# Patient Record
Sex: MALE | Race: WHITE
[De-identification: names, ages, dates, MRNs, and addresses within clinical notes are randomized per-mention and may not be internally consistent; named-entity substitution may affect disease eponyms.]

---

## 2017-06-14 ENCOUNTER — HOSPITAL ENCOUNTER (INPATIENT)
Dept: HOSPITAL 12 - ER | Age: 77
LOS: 6 days | Discharge: HOME HEALTH | DRG: 871 | End: 2017-06-20
Payer: MEDICARE

## 2017-06-14 VITALS — BODY MASS INDEX: 27.09 KG/M2 | WEIGHT: 200 LBS | HEIGHT: 72 IN

## 2017-06-14 VITALS — DIASTOLIC BLOOD PRESSURE: 73 MMHG | SYSTOLIC BLOOD PRESSURE: 122 MMHG

## 2017-06-14 VITALS — SYSTOLIC BLOOD PRESSURE: 130 MMHG | DIASTOLIC BLOOD PRESSURE: 78 MMHG

## 2017-06-14 DIAGNOSIS — G20: ICD-10-CM

## 2017-06-14 DIAGNOSIS — Z79.84: ICD-10-CM

## 2017-06-14 DIAGNOSIS — N40.0: ICD-10-CM

## 2017-06-14 DIAGNOSIS — D68.59: ICD-10-CM

## 2017-06-14 DIAGNOSIS — Z74.01: ICD-10-CM

## 2017-06-14 DIAGNOSIS — Z74.09: ICD-10-CM

## 2017-06-14 DIAGNOSIS — Z95.1: ICD-10-CM

## 2017-06-14 DIAGNOSIS — K76.0: ICD-10-CM

## 2017-06-14 DIAGNOSIS — N17.0: ICD-10-CM

## 2017-06-14 DIAGNOSIS — E87.2: ICD-10-CM

## 2017-06-14 DIAGNOSIS — Z79.82: ICD-10-CM

## 2017-06-14 DIAGNOSIS — I10: ICD-10-CM

## 2017-06-14 DIAGNOSIS — D64.9: ICD-10-CM

## 2017-06-14 DIAGNOSIS — Z87.440: ICD-10-CM

## 2017-06-14 DIAGNOSIS — Z79.899: ICD-10-CM

## 2017-06-14 DIAGNOSIS — E78.00: ICD-10-CM

## 2017-06-14 DIAGNOSIS — R65.21: ICD-10-CM

## 2017-06-14 DIAGNOSIS — G93.41: ICD-10-CM

## 2017-06-14 DIAGNOSIS — Z98.890: ICD-10-CM

## 2017-06-14 DIAGNOSIS — E11.9: ICD-10-CM

## 2017-06-14 DIAGNOSIS — B95.62: ICD-10-CM

## 2017-06-14 DIAGNOSIS — N39.0: ICD-10-CM

## 2017-06-14 DIAGNOSIS — R13.10: ICD-10-CM

## 2017-06-14 DIAGNOSIS — E66.9: ICD-10-CM

## 2017-06-14 DIAGNOSIS — N20.0: ICD-10-CM

## 2017-06-14 DIAGNOSIS — I25.10: ICD-10-CM

## 2017-06-14 DIAGNOSIS — Z88.8: ICD-10-CM

## 2017-06-14 DIAGNOSIS — K44.9: ICD-10-CM

## 2017-06-14 DIAGNOSIS — K21.9: ICD-10-CM

## 2017-06-14 DIAGNOSIS — A41.9: Primary | ICD-10-CM

## 2017-06-14 DIAGNOSIS — Z90.79: ICD-10-CM

## 2017-06-14 DIAGNOSIS — G47.33: ICD-10-CM

## 2017-06-14 LAB
ALP SERPL-CCNC: 46 U/L (ref 50–136)
ALT SERPL W/O P-5'-P-CCNC: 13 U/L (ref 16–63)
APPEARANCE UR: (no result)
AST SERPL-CCNC: 17 U/L (ref 15–37)
BASOPHILS # BLD AUTO: 0 K/UL (ref 0–8)
BASOPHILS NFR BLD AUTO: 0.3 % (ref 0–2)
BILIRUB DIRECT SERPL-MCNC: 0.1 MG/DL (ref 0–0.2)
BILIRUB SERPL-MCNC: 0.3 MG/DL (ref 0.2–1)
BILIRUB UR QL STRIP: NEGATIVE
BUN SERPL-MCNC: 31 MG/DL (ref 7–18)
CHLORIDE SERPL-SCNC: 104 MMOL/L (ref 98–107)
CO2 SERPL-SCNC: 27 MMOL/L (ref 21–32)
COLOR UR: YELLOW
CREAT SERPL-MCNC: 1.4 MG/DL (ref 0.6–1.3)
DEPRECATED SQUAMOUS URNS QL MICRO: (no result) /HPF
EOSINOPHIL # BLD AUTO: 0.1 K/UL (ref 0–0.7)
EOSINOPHIL NFR BLD AUTO: 0.9 % (ref 0–7)
GLUCOSE SERPL-MCNC: 128 MG/DL (ref 74–106)
GLUCOSE UR STRIP-MCNC: NEGATIVE MG/DL
HCT VFR BLD AUTO: 36.1 % (ref 40–50)
HGB BLD-MCNC: 12.1 G/DL (ref 14–18)
HGB UR QL STRIP: NEGATIVE
IRON SERPL-MCNC: 56 UG/DL (ref 50–175)
KETONES UR STRIP-MCNC: (no result) MG/DL
LEUKOCYTE ESTERASE UR QL STRIP: (no result)
LYMPHOCYTES # BLD AUTO: 0.9 K/UL (ref 0.8–4.8)
LYMPHOCYTES NFR BLD AUTO: 12.7 % (ref 20.5–51.5)
MCH RBC QN AUTO: 30.2 UUG (ref 27–31)
MCHC RBC AUTO-ENTMCNC: 34 G/DL (ref 32–37)
MCV RBC AUTO: 89.9 FL (ref 82–92)
MONOCYTES # BLD AUTO: 0.5 K/UL (ref 0.1–1.3)
MONOCYTES NFR BLD AUTO: 6.7 % (ref 0–11)
NEUTROPHILS # BLD AUTO: 5.4 K/UL (ref 1.8–8.9)
NEUTROPHILS NFR BLD AUTO: 79.4 % (ref 38.5–71.5)
NITRITE UR QL STRIP: POSITIVE
PH UR STRIP: 7.5 [PH] (ref 5–8)
PLATELET # BLD AUTO: 269 K/UL (ref 150–450)
POTASSIUM SERPL-SCNC: 4.5 MMOL/L (ref 3.5–5.1)
PROT UR QL STRIP: (no result)
RBC # BLD AUTO: 4.02 MIL/UL (ref 4.7–6.1)
RBC #/AREA URNS HPF: (no result) /HPF (ref 0–3)
SP GR UR STRIP: 1.01 (ref 1–1.03)
UROBILINOGEN UR STRIP-MCNC: 0.2 E.U./DL
WBC # BLD AUTO: 6.9 K/UL (ref 4–11.2)
WBC #/AREA URNS HPF: (no result) /HPF
WS STN SPEC: 8.1 G/DL (ref 6.4–8.2)

## 2017-06-14 PROCEDURE — C1758 CATHETER, URETERAL: HCPCS

## 2017-06-14 PROCEDURE — C9113 INJ PANTOPRAZOLE SODIUM, VIA: HCPCS

## 2017-06-14 PROCEDURE — A4663 DIALYSIS BLOOD PRESSURE CUFF: HCPCS

## 2017-06-14 RX ADMIN — METOPROLOL SUCCINATE SCH MG: 50 TABLET, FILM COATED, EXTENDED RELEASE ORAL at 21:00

## 2017-06-14 RX ADMIN — DEXTROSE AND SODIUM CHLORIDE PRN MLS/HR: 5; .9 INJECTION, SOLUTION INTRAVENOUS at 17:51

## 2017-06-14 RX ADMIN — CARBIDOPA AND LEVODOPA SCH TAB.SA: 25; 100 TABLET, EXTENDED RELEASE ORAL at 21:00

## 2017-06-14 NOTE — NUR
nsg: pt received awake but aphasic. does not follow commands.  on RA saturating at 95%. 
tele, SR. private caregiver at the bedside.  npo but on cont ivf with d5ns at 100ml/hr. HOB 
elevated 35 degrees. cont to monitor.

## 2017-06-14 NOTE — NUR
PT IS LAYING IN BED COMFORTABLY. NO S/S OF RESPIRATORY DISTRESS NOTED. NO P[AIN NOTED. ALL 
SAFETY NEEDS ARE MET. SITTER BY THE BEDSIDE.

## 2017-06-15 VITALS — DIASTOLIC BLOOD PRESSURE: 72 MMHG | SYSTOLIC BLOOD PRESSURE: 111 MMHG

## 2017-06-15 VITALS — DIASTOLIC BLOOD PRESSURE: 66 MMHG | SYSTOLIC BLOOD PRESSURE: 106 MMHG

## 2017-06-15 VITALS — SYSTOLIC BLOOD PRESSURE: 139 MMHG | DIASTOLIC BLOOD PRESSURE: 82 MMHG

## 2017-06-15 VITALS — DIASTOLIC BLOOD PRESSURE: 72 MMHG | SYSTOLIC BLOOD PRESSURE: 139 MMHG

## 2017-06-15 VITALS — SYSTOLIC BLOOD PRESSURE: 128 MMHG | DIASTOLIC BLOOD PRESSURE: 81 MMHG

## 2017-06-15 LAB
ALP SERPL-CCNC: 39 U/L (ref 50–136)
ALT SERPL W/O P-5'-P-CCNC: 15 U/L (ref 16–63)
AST SERPL-CCNC: 14 U/L (ref 15–37)
BASOPHILS # BLD AUTO: 0 K/UL (ref 0–8)
BASOPHILS NFR BLD AUTO: 0.3 % (ref 0–2)
BILIRUB SERPL-MCNC: 0.3 MG/DL (ref 0.2–1)
BUN SERPL-MCNC: 18 MG/DL (ref 7–18)
CHLORIDE SERPL-SCNC: 105 MMOL/L (ref 98–107)
CO2 SERPL-SCNC: 25 MMOL/L (ref 21–32)
CREAT SERPL-MCNC: 1 MG/DL (ref 0.6–1.3)
EOSINOPHIL # BLD AUTO: 0.1 K/UL (ref 0–0.7)
EOSINOPHIL NFR BLD AUTO: 1.8 % (ref 0–7)
GLUCOSE SERPL-MCNC: 144 MG/DL (ref 74–106)
HCT VFR BLD AUTO: 30.2 % (ref 40–50)
HGB BLD-MCNC: 10.4 G/DL (ref 14–18)
LYMPHOCYTES # BLD AUTO: 0.8 K/UL (ref 0.8–4.8)
LYMPHOCYTES NFR BLD AUTO: 20.6 % (ref 20.5–51.5)
MCH RBC QN AUTO: 30.7 UUG (ref 27–31)
MCHC RBC AUTO-ENTMCNC: 35 G/DL (ref 32–37)
MCV RBC AUTO: 88.8 FL (ref 82–92)
MONOCYTES # BLD AUTO: 0.4 K/UL (ref 0.1–1.3)
MONOCYTES NFR BLD AUTO: 9.2 % (ref 0–11)
NEUTROPHILS # BLD AUTO: 2.6 K/UL (ref 1.8–8.9)
NEUTROPHILS NFR BLD AUTO: 68.1 % (ref 38.5–71.5)
PLATELET # BLD AUTO: 200 K/UL (ref 150–450)
POTASSIUM SERPL-SCNC: 3.7 MMOL/L (ref 3.5–5.1)
RBC # BLD AUTO: 3.4 MIL/UL (ref 4.7–6.1)
WBC # BLD AUTO: 3.9 K/UL (ref 4–11.2)
WS STN SPEC: 6.8 G/DL (ref 6.4–8.2)

## 2017-06-15 RX ADMIN — METOPROLOL SUCCINATE SCH MG: 50 TABLET, FILM COATED, EXTENDED RELEASE ORAL at 20:55

## 2017-06-15 RX ADMIN — SODIUM CHLORIDE SCH UNIT: 9 INJECTION, SOLUTION INTRAVENOUS at 05:59

## 2017-06-15 RX ADMIN — Medication SCH EACH: at 00:04

## 2017-06-15 RX ADMIN — LEVOFLOXACIN SCH MLS/HR: 5 INJECTION, SOLUTION INTRAVENOUS at 13:42

## 2017-06-15 RX ADMIN — ASPIRIN SCH MG: 81 TABLET, COATED ORAL at 09:33

## 2017-06-15 RX ADMIN — BETHANECHOL CHLORIDE SCH MG: 25 TABLET ORAL at 16:11

## 2017-06-15 RX ADMIN — SODIUM CHLORIDE SCH UNIT: 9 INJECTION, SOLUTION INTRAVENOUS at 11:52

## 2017-06-15 RX ADMIN — PIOGLITAZONE SCH MG: 30 TABLET ORAL at 11:36

## 2017-06-15 RX ADMIN — DEXTROSE AND SODIUM CHLORIDE PRN MLS/HR: 5; .9 INJECTION, SOLUTION INTRAVENOUS at 04:05

## 2017-06-15 RX ADMIN — Medication SCH EACH: at 16:25

## 2017-06-15 RX ADMIN — BETHANECHOL CHLORIDE SCH MG: 25 TABLET ORAL at 09:34

## 2017-06-15 RX ADMIN — Medication SCH EACH: at 11:54

## 2017-06-15 RX ADMIN — CARBIDOPA AND LEVODOPA SCH TAB: 25; 250 TABLET ORAL at 18:40

## 2017-06-15 RX ADMIN — PANTOPRAZOLE SODIUM SCH MG: 40 TABLET, DELAYED RELEASE ORAL at 05:59

## 2017-06-15 RX ADMIN — Medication SCH EACH: at 20:56

## 2017-06-15 RX ADMIN — CARBIDOPA AND LEVODOPA SCH TAB: 25; 250 TABLET ORAL at 06:00

## 2017-06-15 RX ADMIN — BETHANECHOL CHLORIDE SCH MG: 25 TABLET ORAL at 13:31

## 2017-06-15 RX ADMIN — CARBIDOPA AND LEVODOPA SCH TAB: 25; 250 TABLET ORAL at 16:09

## 2017-06-15 RX ADMIN — SODIUM CHLORIDE SCH UNIT: 9 INJECTION, SOLUTION INTRAVENOUS at 00:05

## 2017-06-15 RX ADMIN — ENALAPRIL MALEATE SCH MG: 10 TABLET ORAL at 15:00

## 2017-06-15 RX ADMIN — Medication SCH EACH: at 05:57

## 2017-06-15 RX ADMIN — CARBIDOPA AND LEVODOPA SCH TAB: 25; 250 TABLET ORAL at 11:37

## 2017-06-15 RX ADMIN — CARBIDOPA AND LEVODOPA SCH TAB.SA: 25; 100 TABLET, EXTENDED RELEASE ORAL at 20:55

## 2017-06-15 NOTE — NUR
ST eval done at bedside, with recommendation for Mechanical soft, Thickened nectar ordered. 
Breakfast served with aspiration precaution. Caregiver instructed.

## 2017-06-16 VITALS — DIASTOLIC BLOOD PRESSURE: 63 MMHG | SYSTOLIC BLOOD PRESSURE: 93 MMHG

## 2017-06-16 VITALS — SYSTOLIC BLOOD PRESSURE: 117 MMHG | DIASTOLIC BLOOD PRESSURE: 72 MMHG

## 2017-06-16 VITALS — SYSTOLIC BLOOD PRESSURE: 115 MMHG | DIASTOLIC BLOOD PRESSURE: 77 MMHG

## 2017-06-16 VITALS — DIASTOLIC BLOOD PRESSURE: 74 MMHG | SYSTOLIC BLOOD PRESSURE: 134 MMHG

## 2017-06-16 RX ADMIN — BETHANECHOL CHLORIDE SCH MG: 25 TABLET ORAL at 08:48

## 2017-06-16 RX ADMIN — SODIUM CHLORIDE PRN UNIT: 9 INJECTION, SOLUTION INTRAVENOUS at 11:58

## 2017-06-16 RX ADMIN — METOPROLOL SUCCINATE SCH MG: 50 TABLET, FILM COATED, EXTENDED RELEASE ORAL at 20:42

## 2017-06-16 RX ADMIN — ENALAPRIL MALEATE SCH MG: 10 TABLET ORAL at 15:00

## 2017-06-16 RX ADMIN — CARBIDOPA AND LEVODOPA SCH TAB: 25; 250 TABLET ORAL at 15:20

## 2017-06-16 RX ADMIN — Medication SCH EACH: at 16:12

## 2017-06-16 RX ADMIN — CARBIDOPA AND LEVODOPA SCH TAB: 25; 250 TABLET ORAL at 11:59

## 2017-06-16 RX ADMIN — CARBIDOPA AND LEVODOPA SCH TAB: 25; 250 TABLET ORAL at 06:11

## 2017-06-16 RX ADMIN — ANORECTAL OINTMENT SCH GM: 15.7; .44; 24; 20.6 OINTMENT TOPICAL at 20:42

## 2017-06-16 RX ADMIN — PIOGLITAZONE SCH MG: 30 TABLET ORAL at 11:58

## 2017-06-16 RX ADMIN — Medication SCH EACH: at 20:42

## 2017-06-16 RX ADMIN — BETHANECHOL CHLORIDE SCH MG: 25 TABLET ORAL at 16:22

## 2017-06-16 RX ADMIN — DEXTROSE SCH MLS/HR: 50 INJECTION, SOLUTION INTRAVENOUS at 20:11

## 2017-06-16 RX ADMIN — ACETAMINOPHEN PRN MG: 325 TABLET ORAL at 20:42

## 2017-06-16 RX ADMIN — CARBIDOPA AND LEVODOPA SCH TAB.SA: 25; 100 TABLET, EXTENDED RELEASE ORAL at 20:41

## 2017-06-16 RX ADMIN — Medication SCH EACH: at 06:33

## 2017-06-16 RX ADMIN — PANTOPRAZOLE SODIUM SCH MG: 40 TABLET, DELAYED RELEASE ORAL at 06:12

## 2017-06-16 RX ADMIN — ASPIRIN SCH MG: 81 TABLET, COATED ORAL at 08:48

## 2017-06-16 RX ADMIN — ANORECTAL OINTMENT SCH GM: 15.7; .44; 24; 20.6 OINTMENT TOPICAL at 15:20

## 2017-06-16 RX ADMIN — LEVOFLOXACIN SCH MLS/HR: 5 INJECTION, SOLUTION INTRAVENOUS at 12:06

## 2017-06-16 RX ADMIN — CARBIDOPA AND LEVODOPA SCH TAB: 25; 250 TABLET ORAL at 18:24

## 2017-06-16 RX ADMIN — Medication SCH EACH: at 11:53

## 2017-06-16 RX ADMIN — BETHANECHOL CHLORIDE SCH MG: 25 TABLET ORAL at 12:06

## 2017-06-16 NOTE — NUR
PT IS SITTING UP IN BED. NO S/S OF RESPIRATORY DISTRESS NOTED. NO PAIN NOTED. CARETAKER BY 
THE BEDSIDE. IV INTACT/PATENT.

## 2017-06-16 NOTE — NUR
received the nursing report on the pt, z-guard will administer per 's order. Pt is laying 
in bed comfortably. All safety needs are met. Caretaker by the bedside.

## 2017-06-16 NOTE — NUR
WOUND CARE CONSULT: PT PRESENTS WITH STAGE II ULCERS TO BILATERAL BUTTOCKS, PRESENT ON 
ADMISSION. RECOMMENDATIONS MADE FOR WOUND CARE AND SKIN PROTECTION. DISCUSSED WITH NURSING 
STAFF. PT IS INCONTINENT AND SOMEWHAT IMMOBILE. BONITA SCORE IS 10. PT ON FIRST STEP 
MATTRESS. WILL SEE PRN. MD IN AGREEMENT WITH PLAN OF CARE. 

-------------------------------------------------------------------------------

Addendum: 06/16/17 at 1238 by MARIA SCOTT RN

-------------------------------------------------------------------------------

Amended: Links added.

## 2017-06-16 NOTE — NUR
RECEIVED PATIENT AWAKE IN BED WITH PRIVATE CAREGIVER AT BEDSIDE. PATIENT IS ALERT TO SELF. 
APPEARS APHASIC BUT PATIENT DOES SPEAK AT TIMES. NO S/S OF PAIN OR DISCOMFORT. NO RESP. 
DISTRESS NOTED. VSS. H/L INTACT AND PATENT. PATIENT TRANSFERRED ROOM CLOSER TO NURSES 
STATION FOR CLOSER OBSERVATION. ON AIR MATTRESS. DRESSING NOTED TO BUTTOCKS/SACRAL AREA, 
C/D/I. ON RA. CALL LIGHT IN REACH. ALL NEEDS ATTENDED. WILL CONTINUE TO MONITOR.

## 2017-06-16 NOTE — NUR
Clinical pharmacy note-Vancomycin dosing per pharmacy



Subjective:

To start Vancomycin dosing on this 76 year old patient for UTI/early sepsis



Objective:

BUN 18  Scr 1.0 (6/15)  WBC 3.9(6/15)  Temp 98.6

Ht 6'   Wt 200 lbs

Urine culture: Staph Aureus >100k



Assessment\Plan:

Will start Vancomycin 1500mg IV every 16 hrs (first dose tonight at 2000) and draw trough by 
4th dose (not ordered yet) for expected trough around 15( UTI but early sepsis, WBC 3.9, 
diabetic). 

Will monitor renal function closely to adjust the dose if needed. Will follow daily.

## 2017-06-16 NOTE — NUR
PT IN BED RESTING AT THIS TIME, SLEPT INTERMITTENTLY DURING SHIFT. IN NO ACUTE SIGNS OF 
DISTRESS. PT IS NONVERBAL. TURNED AND REPOSITIONED. WOUND CARE TO L AND R BUTTOCKS.  BLOOD 
SUGAR CHECKED, LAST NIGHT WAS 67, GIVEN SNACKS, RECHECKED  AND INCREASED TO 84. THIS AM BS 
. CONTINUED TO MONITOR.

## 2017-06-17 VITALS — SYSTOLIC BLOOD PRESSURE: 104 MMHG | DIASTOLIC BLOOD PRESSURE: 63 MMHG

## 2017-06-17 VITALS — SYSTOLIC BLOOD PRESSURE: 115 MMHG | DIASTOLIC BLOOD PRESSURE: 72 MMHG

## 2017-06-17 VITALS — SYSTOLIC BLOOD PRESSURE: 126 MMHG | DIASTOLIC BLOOD PRESSURE: 75 MMHG

## 2017-06-17 VITALS — SYSTOLIC BLOOD PRESSURE: 112 MMHG | DIASTOLIC BLOOD PRESSURE: 63 MMHG

## 2017-06-17 LAB
BASOPHILS # BLD AUTO: 0 K/UL (ref 0–8)
BASOPHILS NFR BLD AUTO: 0.3 % (ref 0–2)
BUN SERPL-MCNC: 5 MG/DL (ref 7–18)
CHLORIDE SERPL-SCNC: 106 MMOL/L (ref 98–107)
CO2 SERPL-SCNC: 24 MMOL/L (ref 21–32)
CREAT SERPL-MCNC: 1 MG/DL (ref 0.6–1.3)
EOSINOPHIL # BLD AUTO: 0.1 K/UL (ref 0–0.7)
EOSINOPHIL NFR BLD AUTO: 2 % (ref 0–7)
GLUCOSE SERPL-MCNC: 115 MG/DL (ref 74–106)
HCT VFR BLD AUTO: 33.1 % (ref 40–50)
HGB BLD-MCNC: 11.4 G/DL (ref 14–18)
LYMPHOCYTES # BLD AUTO: 0.8 K/UL (ref 0.8–4.8)
LYMPHOCYTES NFR BLD AUTO: 19.6 % (ref 20.5–51.5)
MAGNESIUM SERPL-MCNC: 1.5 MG/DL (ref 1.8–2.4)
MCH RBC QN AUTO: 30.6 UUG (ref 27–31)
MCHC RBC AUTO-ENTMCNC: 34 G/DL (ref 32–37)
MCV RBC AUTO: 89.1 FL (ref 82–92)
MONOCYTES # BLD AUTO: 0.4 K/UL (ref 0.1–1.3)
MONOCYTES NFR BLD AUTO: 8.5 % (ref 0–11)
NEUTROPHILS # BLD AUTO: 2.8 K/UL (ref 1.8–8.9)
NEUTROPHILS NFR BLD AUTO: 69.6 % (ref 38.5–71.5)
PHOSPHATE SERPL-MCNC: 4.2 MG/DL (ref 2.5–4.9)
PLATELET # BLD AUTO: 213 K/UL (ref 150–450)
POTASSIUM SERPL-SCNC: 3.6 MMOL/L (ref 3.5–5.1)
RBC # BLD AUTO: 3.71 MIL/UL (ref 4.7–6.1)
WBC # BLD AUTO: 4.1 K/UL (ref 4–11.2)

## 2017-06-17 RX ADMIN — Medication SCH EACH: at 20:24

## 2017-06-17 RX ADMIN — CARBIDOPA AND LEVODOPA SCH TAB: 25; 250 TABLET ORAL at 19:04

## 2017-06-17 RX ADMIN — Medication SCH EACH: at 17:25

## 2017-06-17 RX ADMIN — DEXTROSE SCH MLS/HR: 50 INJECTION, SOLUTION INTRAVENOUS at 12:08

## 2017-06-17 RX ADMIN — MAGNESIUM SULFATE IN DEXTROSE SCH MLS/HR: 10 INJECTION, SOLUTION INTRAVENOUS at 14:29

## 2017-06-17 RX ADMIN — MAGNESIUM SULFATE IN DEXTROSE SCH MLS/HR: 10 INJECTION, SOLUTION INTRAVENOUS at 16:14

## 2017-06-17 RX ADMIN — SODIUM CHLORIDE PRN UNIT: 9 INJECTION, SOLUTION INTRAVENOUS at 17:28

## 2017-06-17 RX ADMIN — BETHANECHOL CHLORIDE SCH MG: 25 TABLET ORAL at 12:08

## 2017-06-17 RX ADMIN — Medication SCH EACH: at 07:10

## 2017-06-17 RX ADMIN — Medication SCH EACH: at 11:10

## 2017-06-17 RX ADMIN — CARBIDOPA AND LEVODOPA SCH TAB: 25; 250 TABLET ORAL at 11:09

## 2017-06-17 RX ADMIN — ASPIRIN SCH MG: 81 TABLET, COATED ORAL at 09:17

## 2017-06-17 RX ADMIN — MAGNESIUM SULFATE IN DEXTROSE SCH MLS/HR: 10 INJECTION, SOLUTION INTRAVENOUS at 17:26

## 2017-06-17 RX ADMIN — ANORECTAL OINTMENT SCH APPLIC: 15.7; .44; 24; 20.6 OINTMENT TOPICAL at 09:18

## 2017-06-17 RX ADMIN — CARBIDOPA AND LEVODOPA SCH TAB: 25; 250 TABLET ORAL at 16:07

## 2017-06-17 RX ADMIN — CARBIDOPA AND LEVODOPA SCH TAB: 25; 250 TABLET ORAL at 07:09

## 2017-06-17 RX ADMIN — SODIUM CHLORIDE PRN UNIT: 9 INJECTION, SOLUTION INTRAVENOUS at 20:26

## 2017-06-17 RX ADMIN — ENALAPRIL MALEATE SCH MG: 10 TABLET ORAL at 16:09

## 2017-06-17 RX ADMIN — BETHANECHOL CHLORIDE SCH MG: 25 TABLET ORAL at 17:26

## 2017-06-17 RX ADMIN — METOPROLOL SUCCINATE SCH MG: 50 TABLET, FILM COATED, EXTENDED RELEASE ORAL at 20:11

## 2017-06-17 RX ADMIN — BETHANECHOL CHLORIDE SCH MG: 25 TABLET ORAL at 09:17

## 2017-06-17 RX ADMIN — SULFAMETHOXAZOLE AND TRIMETHOPRIM SCH TAB: 800; 160 TABLET ORAL at 20:07

## 2017-06-17 RX ADMIN — CARBIDOPA AND LEVODOPA SCH TAB.SA: 25; 100 TABLET, EXTENDED RELEASE ORAL at 20:08

## 2017-06-17 RX ADMIN — ANORECTAL OINTMENT SCH APPLIC: 15.7; .44; 24; 20.6 OINTMENT TOPICAL at 20:08

## 2017-06-17 RX ADMIN — PIOGLITAZONE SCH MG: 30 TABLET ORAL at 11:09

## 2017-06-17 RX ADMIN — PANTOPRAZOLE SODIUM SCH MG: 40 TABLET, DELAYED RELEASE ORAL at 07:10

## 2017-06-17 RX ADMIN — SODIUM CHLORIDE PRN UNIT: 9 INJECTION, SOLUTION INTRAVENOUS at 12:11

## 2017-06-17 NOTE — NUR
PATIENT ASLEEP IN BED. REPOSITIONED TO SIDE. HEPLOCK NOTED TO RIGHT WRIST, DISLODGED. 
REINSERTED TO LEFT HAND #22 GAUGE. HEAVILY SLEEPING AND APPEARS SLIGHTLY DIAPHORETIC.  
CHECKED BLOOD SUGAR AND RECEIVED 118. VSS. WILL CONTINUE TO MONITOR AND ASSESS.

## 2017-06-17 NOTE — NUR
RECEIVED PATIENT AWAKE IN BED. A/O X2. DR. MCKEON AT BEDSIDE TO SEE PATIENT. PATIENT IS 
ABLE TO FOLLOW DIRECTIONS AND VERBALIZE NEEDS. SPEECH IS DELAYED BUT PATIENT IS ALERT AND 
ABLE TO SPEAK. DENIES PAIN. NO 

S/S OF PAIN OR DISCOMFORT. NO RESP. DISTRESS NOTED. HEPLOCK INTACT AND PATENT, NOTED TO LEFT 
HAND #22 GAUGE. MEPILEX NOTED TO BILATERAL BUTTOCKS, C/D/I. BED ALARM ON. CALL LIGHT IN 
REACH, ALL NEEDS ATTENDED. WILL CONTINUE TO MONITOR.

## 2017-06-17 NOTE — NUR
Clinical pharmacy note-Vancomycin dosing per pharmacy



Subjective:

To continue Vancomycin dosing on this 76 year old patient for UTI/early sepsis



Objective:

BUN 5  Scr 1.0  WBC 4.1  Temp 98.4

Ht 6'   Wt 200 lbs

Urine culture: Staph Aureus >100k



Assessment\Plan:

As renal function stable, will continue Vancomycin 1500mg IV every 16 hrs (second dose was 
today at 1200) and draw trough by 4th dose (not ordered yet) for expected trough around 15( 
UTI but early sepsis, WBC 3.9, diabetic). 

Will monitor renal function closely to adjust the dose if needed. Will follow daily.

## 2017-06-17 NOTE — NUR
REPORT GIVEN TO RN. PATIENT ASLEEP IN BED. NO RESP. DISTRESS NOTED. ALL NEEDS ATTENDED. WILL 
CONTINUE TO MONITOR.

## 2017-06-17 NOTE — NUR
URINE RESULT WAS MRSA POSITIVE REPORT TO JEREMIAH HANLEY AND WILL NOTIFY ID MD DR DREYER/OR NP FOR HIM  TODAY PATIENT ON ANTIBIOTICS VANCOMYCIN AND ZOSYN

## 2017-06-18 VITALS — DIASTOLIC BLOOD PRESSURE: 76 MMHG | SYSTOLIC BLOOD PRESSURE: 122 MMHG

## 2017-06-18 VITALS — DIASTOLIC BLOOD PRESSURE: 68 MMHG | SYSTOLIC BLOOD PRESSURE: 103 MMHG

## 2017-06-18 VITALS — DIASTOLIC BLOOD PRESSURE: 62 MMHG | SYSTOLIC BLOOD PRESSURE: 108 MMHG

## 2017-06-18 VITALS — SYSTOLIC BLOOD PRESSURE: 114 MMHG | DIASTOLIC BLOOD PRESSURE: 67 MMHG

## 2017-06-18 LAB
BASOPHILS # BLD AUTO: 0 K/UL (ref 0–8)
BASOPHILS NFR BLD AUTO: 0 % (ref 0–2)
BUN SERPL-MCNC: 7 MG/DL (ref 7–18)
CHLORIDE SERPL-SCNC: 102 MMOL/L (ref 98–107)
CO2 SERPL-SCNC: 25 MMOL/L (ref 21–32)
CREAT SERPL-MCNC: 1.1 MG/DL (ref 0.6–1.3)
EOSINOPHIL # BLD AUTO: 0.1 K/UL (ref 0–0.7)
EOSINOPHIL NFR BLD AUTO: 1.7 % (ref 0–7)
GLUCOSE SERPL-MCNC: 154 MG/DL (ref 74–106)
HCT VFR BLD AUTO: 38.4 % (ref 40–50)
HGB BLD-MCNC: 12.9 G/DL (ref 14–18)
LYMPHOCYTES # BLD AUTO: 1 K/UL (ref 0.8–4.8)
LYMPHOCYTES NFR BLD AUTO: 16.5 % (ref 20.5–51.5)
MAGNESIUM SERPL-MCNC: 1.9 MG/DL (ref 1.8–2.4)
MCH RBC QN AUTO: 29.9 UUG (ref 27–31)
MCHC RBC AUTO-ENTMCNC: 34 G/DL (ref 32–37)
MCV RBC AUTO: 89.2 FL (ref 82–92)
MONOCYTES # BLD AUTO: 0.4 K/UL (ref 0.1–1.3)
MONOCYTES NFR BLD AUTO: 7.7 % (ref 0–11)
NEUTROPHILS # BLD AUTO: 4.3 K/UL (ref 1.8–8.9)
NEUTROPHILS NFR BLD AUTO: 74.1 % (ref 38.5–71.5)
PHOSPHATE SERPL-MCNC: 4 MG/DL (ref 2.5–4.9)
PLATELET # BLD AUTO: 246 K/UL (ref 150–450)
POTASSIUM SERPL-SCNC: 3.6 MMOL/L (ref 3.5–5.1)
RBC # BLD AUTO: 4.31 MIL/UL (ref 4.7–6.1)
WBC # BLD AUTO: 5.8 K/UL (ref 4–11.2)

## 2017-06-18 RX ADMIN — Medication SCH EACH: at 06:45

## 2017-06-18 RX ADMIN — PANTOPRAZOLE SODIUM SCH MG: 40 TABLET, DELAYED RELEASE ORAL at 07:00

## 2017-06-18 RX ADMIN — CARBIDOPA AND LEVODOPA SCH TAB: 25; 250 TABLET ORAL at 15:29

## 2017-06-18 RX ADMIN — CARBIDOPA AND LEVODOPA SCH TAB: 25; 250 TABLET ORAL at 18:25

## 2017-06-18 RX ADMIN — SODIUM CHLORIDE PRN UNIT: 9 INJECTION, SOLUTION INTRAVENOUS at 21:16

## 2017-06-18 RX ADMIN — Medication SCH EACH: at 20:32

## 2017-06-18 RX ADMIN — SULFAMETHOXAZOLE AND TRIMETHOPRIM SCH TAB: 800; 160 TABLET ORAL at 08:37

## 2017-06-18 RX ADMIN — ANORECTAL OINTMENT SCH APPLIC: 15.7; .44; 24; 20.6 OINTMENT TOPICAL at 20:31

## 2017-06-18 RX ADMIN — CARBIDOPA AND LEVODOPA SCH TAB: 25; 250 TABLET ORAL at 07:00

## 2017-06-18 RX ADMIN — BETHANECHOL CHLORIDE SCH MG: 25 TABLET ORAL at 08:37

## 2017-06-18 RX ADMIN — PIOGLITAZONE SCH MG: 30 TABLET ORAL at 11:36

## 2017-06-18 RX ADMIN — Medication SCH EACH: at 15:49

## 2017-06-18 RX ADMIN — ENALAPRIL MALEATE SCH MG: 10 TABLET ORAL at 15:00

## 2017-06-18 RX ADMIN — BETHANECHOL CHLORIDE SCH MG: 25 TABLET ORAL at 15:49

## 2017-06-18 RX ADMIN — CARBIDOPA AND LEVODOPA SCH TAB: 25; 250 TABLET ORAL at 11:36

## 2017-06-18 RX ADMIN — METOPROLOL SUCCINATE SCH MG: 50 TABLET, FILM COATED, EXTENDED RELEASE ORAL at 20:51

## 2017-06-18 RX ADMIN — Medication SCH EACH: at 11:39

## 2017-06-18 RX ADMIN — SULFAMETHOXAZOLE AND TRIMETHOPRIM SCH TAB: 800; 160 TABLET ORAL at 20:31

## 2017-06-18 RX ADMIN — ACETAMINOPHEN PRN MG: 325 TABLET ORAL at 08:37

## 2017-06-18 RX ADMIN — SODIUM CHLORIDE PRN UNIT: 9 INJECTION, SOLUTION INTRAVENOUS at 12:11

## 2017-06-18 RX ADMIN — SODIUM CHLORIDE PRN UNIT: 9 INJECTION, SOLUTION INTRAVENOUS at 08:41

## 2017-06-18 RX ADMIN — PANTOPRAZOLE SODIUM SCH MG: 40 TABLET, DELAYED RELEASE ORAL at 06:45

## 2017-06-18 RX ADMIN — BETHANECHOL CHLORIDE SCH MG: 25 TABLET ORAL at 12:11

## 2017-06-18 RX ADMIN — ASPIRIN SCH MG: 81 TABLET, COATED ORAL at 08:37

## 2017-06-18 RX ADMIN — ANORECTAL OINTMENT SCH APPLIC: 15.7; .44; 24; 20.6 OINTMENT TOPICAL at 08:38

## 2017-06-18 RX ADMIN — CARBIDOPA AND LEVODOPA SCH TAB: 25; 250 TABLET ORAL at 06:45

## 2017-06-18 RX ADMIN — CARBIDOPA AND LEVODOPA SCH TAB.SA: 25; 100 TABLET, EXTENDED RELEASE ORAL at 20:31

## 2017-06-18 NOTE — NUR
DR COMER ,S CATHERINE PATIENT AND LAB RESULT [facility] 
TAKER AT BEDSIDE

## 2017-06-18 NOTE — NUR
STABLE CONDITION NO RESPIRATORY DISTRESS SAFETY MEASURE PROVIDED AND CALL LIGHT WITHIN 
REACH INSTRUCTION TO CALL WHEN NEEDED

## 2017-06-18 NOTE — NUR
RESTING QUIET NO SOB OR PAIN ON FALL  AND ASPIRATION PRECAUTION  BED ALARM ON AND CALL 
LIGHT WITHIN REACH

## 2017-06-18 NOTE — NUR
PATIENT AWAKE IN BED. PASSIVE WHEN APPROACHED. PATIENT REFUSING TO TAKE MEDS AT THIS TIME. 
WILL TRY AGAIN.

## 2017-06-18 NOTE — NUR
PATIENT AWAKE. ATTEMPTED TO GIVE MEDS AND PATIENT REFUSED AND SPIT OUT MEDICATION. ENDORSED 
TO DAY SHIFT RN THAT PATIENT IS REFUSING . ALL NEEDS ATTENDED.

## 2017-06-18 NOTE — NUR
PATIENT RECEIVED IN BED SITTING UP, NO ACUTE DISTRESS NOTED. PATIENT IS ALERT BUT SLOW IN 
RESPONSE AND FORGETFUL. HEP LOCK TO LEFT FOREARM, INTACT LUNGS CLEAR THROUGH OUT WITH 
AUSCULTATION. REMAINS ON ISOLATION MRSA URINE. PT ON AIR MATTRESS AND TURNED EVERY TWO 
HOURS. NO ACUTE DISTRESS NOTED. BED IN LOW AND LOCKED POSITION, CALL LIGHT WITHIN REACH.

## 2017-06-18 NOTE — NUR
Patient observed in bed restless. Patient non verbal but makes eye contact and able to nodd 
yes or no. Pt denies any pain or physical discomfort. Pt changed by staff, skin care 
provided. Remains on isolation of urine MRSA. No acute distress noted. Will continue to 
monitor for safety.

## 2017-06-19 VITALS — DIASTOLIC BLOOD PRESSURE: 71 MMHG | SYSTOLIC BLOOD PRESSURE: 101 MMHG

## 2017-06-19 VITALS — SYSTOLIC BLOOD PRESSURE: 105 MMHG | DIASTOLIC BLOOD PRESSURE: 67 MMHG

## 2017-06-19 VITALS — DIASTOLIC BLOOD PRESSURE: 68 MMHG | SYSTOLIC BLOOD PRESSURE: 128 MMHG

## 2017-06-19 VITALS — SYSTOLIC BLOOD PRESSURE: 113 MMHG | DIASTOLIC BLOOD PRESSURE: 59 MMHG

## 2017-06-19 VITALS — SYSTOLIC BLOOD PRESSURE: 116 MMHG | DIASTOLIC BLOOD PRESSURE: 72 MMHG

## 2017-06-19 LAB
BASOPHILS # BLD AUTO: 0 K/UL (ref 0–8)
BASOPHILS NFR BLD AUTO: 0.3 % (ref 0–2)
BUN SERPL-MCNC: 13 MG/DL (ref 7–18)
CHLORIDE SERPL-SCNC: 104 MMOL/L (ref 98–107)
CO2 SERPL-SCNC: 25 MMOL/L (ref 21–32)
CREAT SERPL-MCNC: 1.1 MG/DL (ref 0.6–1.3)
EOSINOPHIL # BLD AUTO: 0.1 K/UL (ref 0–0.7)
EOSINOPHIL NFR BLD AUTO: 1.5 % (ref 0–7)
GLUCOSE SERPL-MCNC: 111 MG/DL (ref 74–106)
HCT VFR BLD AUTO: 34.1 % (ref 40–50)
HGB BLD-MCNC: 11.4 G/DL (ref 14–18)
LYMPHOCYTES # BLD AUTO: 0.8 K/UL (ref 0.8–4.8)
LYMPHOCYTES NFR BLD AUTO: 19 % (ref 20.5–51.5)
MAGNESIUM SERPL-MCNC: 2 MG/DL (ref 1.8–2.4)
MCH RBC QN AUTO: 29.9 UUG (ref 27–31)
MCHC RBC AUTO-ENTMCNC: 33 G/DL (ref 32–37)
MCV RBC AUTO: 89.7 FL (ref 82–92)
MONOCYTES # BLD AUTO: 0.3 K/UL (ref 0.1–1.3)
MONOCYTES NFR BLD AUTO: 7.9 % (ref 0–11)
NEUTROPHILS # BLD AUTO: 3.2 K/UL (ref 1.8–8.9)
NEUTROPHILS NFR BLD AUTO: 71.3 % (ref 38.5–71.5)
PHOSPHATE SERPL-MCNC: 4 MG/DL (ref 2.5–4.9)
PLATELET # BLD AUTO: 235 K/UL (ref 150–450)
POTASSIUM SERPL-SCNC: 3.5 MMOL/L (ref 3.5–5.1)
RBC # BLD AUTO: 3.8 MIL/UL (ref 4.7–6.1)
WBC # BLD AUTO: 4.4 K/UL (ref 4–11.2)

## 2017-06-19 RX ADMIN — METOPROLOL SUCCINATE SCH MG: 50 TABLET, FILM COATED, EXTENDED RELEASE ORAL at 21:43

## 2017-06-19 RX ADMIN — Medication SCH EACH: at 16:38

## 2017-06-19 RX ADMIN — SODIUM CHLORIDE PRN UNIT: 9 INJECTION, SOLUTION INTRAVENOUS at 16:42

## 2017-06-19 RX ADMIN — CARBIDOPA AND LEVODOPA SCH TAB: 25; 250 TABLET ORAL at 19:00

## 2017-06-19 RX ADMIN — ENALAPRIL MALEATE SCH MG: 10 TABLET ORAL at 15:54

## 2017-06-19 RX ADMIN — ANORECTAL OINTMENT SCH APPLIC: 15.7; .44; 24; 20.6 OINTMENT TOPICAL at 08:32

## 2017-06-19 RX ADMIN — ANORECTAL OINTMENT SCH APPLIC: 15.7; .44; 24; 20.6 OINTMENT TOPICAL at 21:44

## 2017-06-19 RX ADMIN — PIOGLITAZONE SCH MG: 30 TABLET ORAL at 11:26

## 2017-06-19 RX ADMIN — Medication SCH EACH: at 06:49

## 2017-06-19 RX ADMIN — BETHANECHOL CHLORIDE SCH MG: 25 TABLET ORAL at 12:54

## 2017-06-19 RX ADMIN — CARBIDOPA AND LEVODOPA SCH TAB: 25; 250 TABLET ORAL at 06:12

## 2017-06-19 RX ADMIN — BETHANECHOL CHLORIDE SCH MG: 25 TABLET ORAL at 08:32

## 2017-06-19 RX ADMIN — SODIUM CHLORIDE PRN UNIT: 9 INJECTION, SOLUTION INTRAVENOUS at 16:48

## 2017-06-19 RX ADMIN — PANTOPRAZOLE SODIUM SCH MG: 40 TABLET, DELAYED RELEASE ORAL at 06:13

## 2017-06-19 RX ADMIN — Medication SCH EACH: at 21:43

## 2017-06-19 RX ADMIN — CARBIDOPA AND LEVODOPA SCH TAB: 25; 250 TABLET ORAL at 15:54

## 2017-06-19 RX ADMIN — Medication SCH EACH: at 16:45

## 2017-06-19 RX ADMIN — SULFAMETHOXAZOLE AND TRIMETHOPRIM SCH TAB: 800; 160 TABLET ORAL at 08:31

## 2017-06-19 RX ADMIN — CARBIDOPA AND LEVODOPA SCH TAB: 25; 250 TABLET ORAL at 11:26

## 2017-06-19 RX ADMIN — BETHANECHOL CHLORIDE SCH MG: 25 TABLET ORAL at 16:37

## 2017-06-19 RX ADMIN — SULFAMETHOXAZOLE AND TRIMETHOPRIM SCH TAB: 800; 160 TABLET ORAL at 21:43

## 2017-06-19 RX ADMIN — Medication SCH EACH: at 11:26

## 2017-06-19 RX ADMIN — ACETAMINOPHEN PRN MG: 325 TABLET ORAL at 08:31

## 2017-06-19 RX ADMIN — ASPIRIN SCH MG: 81 TABLET, COATED ORAL at 08:32

## 2017-06-19 RX ADMIN — CARBIDOPA AND LEVODOPA SCH TAB.SA: 25; 100 TABLET, EXTENDED RELEASE ORAL at 21:44

## 2017-06-19 NOTE — NUR
patient changed and given bed bath by staff. noted redness to buttocks, skin care provided 
as ordered and mepalex applied. Patient able to go back to sleep, no acute distress noted. 
Remains on contact isolation for MRSA of urine. Safety. measures applied.

## 2017-06-19 NOTE — NUR
AWAKE [place]  WELL NO SOB OR PAIN ON ASPIRATION AND FALL PRECAUTION  BED ALARM ON AND 
CALL BELL WITHIN REACH CARE TAKER AT BEDSIDE EAT BREAKFAST MOD AMT  THIS AM

## 2017-06-19 NOTE — NUR
RECEIVED PATIENT ASLEEP IN BED. EASILY AROUSABLE. A/O X2. ABLE TO VERBALIZE NEEDS, SPEECH 
DELAYED AT TIMES. NO S/S OF PAIN OR DISCOMFORT. NO RESP. DISTRESS NOTED. ON AIR MATTRESS. VS 
WNL. HEPLOCK INTACT AND PATENT, NOTED TO LEFT FA #22 GAUGE. BED ALARM ON. CALL LIGHT IN 
REACH. ALL NEEDS ATTENDED. WILL CONTINUE TO MONITOR.

## 2017-06-19 NOTE — NUR
SAME CONDITION NO RESPIRATORY DISTRESS SAFETY MEASURE PROVIDED CALL BELL WITHIN REACH AND 
BED ALARM ON

## 2017-06-20 VITALS — DIASTOLIC BLOOD PRESSURE: 59 MMHG | SYSTOLIC BLOOD PRESSURE: 109 MMHG

## 2017-06-20 VITALS — DIASTOLIC BLOOD PRESSURE: 59 MMHG | SYSTOLIC BLOOD PRESSURE: 110 MMHG

## 2017-06-20 VITALS — DIASTOLIC BLOOD PRESSURE: 71 MMHG | SYSTOLIC BLOOD PRESSURE: 115 MMHG

## 2017-06-20 VITALS — DIASTOLIC BLOOD PRESSURE: 63 MMHG | SYSTOLIC BLOOD PRESSURE: 110 MMHG

## 2017-06-20 LAB
BASOPHILS # BLD AUTO: 0 K/UL (ref 0–8)
BASOPHILS NFR BLD AUTO: 0.2 % (ref 0–2)
BUN SERPL-MCNC: 12 MG/DL (ref 7–18)
CHLORIDE SERPL-SCNC: 103 MMOL/L (ref 98–107)
CO2 SERPL-SCNC: 25 MMOL/L (ref 21–32)
CREAT SERPL-MCNC: 1.2 MG/DL (ref 0.6–1.3)
EOSINOPHIL # BLD AUTO: 0.1 K/UL (ref 0–0.7)
EOSINOPHIL NFR BLD AUTO: 1.4 % (ref 0–7)
GLUCOSE SERPL-MCNC: 142 MG/DL (ref 74–106)
HCT VFR BLD AUTO: 34.1 % (ref 40–50)
HGB BLD-MCNC: 11.5 G/DL (ref 14–18)
LYMPHOCYTES # BLD AUTO: 0.9 K/UL (ref 0.8–4.8)
LYMPHOCYTES NFR BLD AUTO: 12.9 % (ref 20.5–51.5)
MAGNESIUM SERPL-MCNC: 1.7 MG/DL (ref 1.8–2.4)
MCH RBC QN AUTO: 30.4 UUG (ref 27–31)
MCHC RBC AUTO-ENTMCNC: 34 G/DL (ref 32–37)
MCV RBC AUTO: 89.9 FL (ref 82–92)
MONOCYTES # BLD AUTO: 0.5 K/UL (ref 0.1–1.3)
MONOCYTES NFR BLD AUTO: 6.7 % (ref 0–11)
NEUTROPHILS # BLD AUTO: 5.9 K/UL (ref 1.8–8.9)
NEUTROPHILS NFR BLD AUTO: 78.8 % (ref 38.5–71.5)
PHOSPHATE SERPL-MCNC: 3.7 MG/DL (ref 2.5–4.9)
PLATELET # BLD AUTO: 229 K/UL (ref 150–450)
POTASSIUM SERPL-SCNC: 3.8 MMOL/L (ref 3.5–5.1)
RBC # BLD AUTO: 3.8 MIL/UL (ref 4.7–6.1)
WBC # BLD AUTO: 7.4 K/UL (ref 4–11.2)

## 2017-06-20 RX ADMIN — ASPIRIN SCH MG: 81 TABLET, COATED ORAL at 08:52

## 2017-06-20 RX ADMIN — ENALAPRIL MALEATE SCH MG: 10 TABLET ORAL at 15:53

## 2017-06-20 RX ADMIN — SULFAMETHOXAZOLE AND TRIMETHOPRIM SCH TAB: 800; 160 TABLET ORAL at 08:52

## 2017-06-20 RX ADMIN — ANORECTAL OINTMENT SCH APPLIC: 15.7; .44; 24; 20.6 OINTMENT TOPICAL at 08:52

## 2017-06-20 RX ADMIN — PIOGLITAZONE SCH MG: 30 TABLET ORAL at 11:57

## 2017-06-20 RX ADMIN — BETHANECHOL CHLORIDE SCH MG: 25 TABLET ORAL at 13:31

## 2017-06-20 RX ADMIN — PANTOPRAZOLE SODIUM SCH MG: 40 TABLET, DELAYED RELEASE ORAL at 06:12

## 2017-06-20 RX ADMIN — SODIUM CHLORIDE PRN UNIT: 9 INJECTION, SOLUTION INTRAVENOUS at 16:31

## 2017-06-20 RX ADMIN — Medication SCH EACH: at 11:59

## 2017-06-20 RX ADMIN — CARBIDOPA AND LEVODOPA SCH TAB: 25; 250 TABLET ORAL at 11:57

## 2017-06-20 RX ADMIN — BETHANECHOL CHLORIDE SCH MG: 25 TABLET ORAL at 08:52

## 2017-06-20 RX ADMIN — CARBIDOPA AND LEVODOPA SCH TAB: 25; 250 TABLET ORAL at 07:34

## 2017-06-20 RX ADMIN — CARBIDOPA AND LEVODOPA SCH TAB: 25; 250 TABLET ORAL at 15:53

## 2017-06-20 RX ADMIN — Medication SCH EACH: at 16:28

## 2017-06-20 RX ADMIN — SODIUM CHLORIDE PRN UNIT: 9 INJECTION, SOLUTION INTRAVENOUS at 08:19

## 2017-06-20 RX ADMIN — Medication SCH EACH: at 06:40

## 2017-06-20 NOTE — NUR
Discharge Plan:





Once medically cleared patient will be discharged back home [[id number] [place] 
[address]]. Patient has a caregiver. [facility] [[phone]] will follow up with the 
patient. Patient will be transported via private care with caregiver. Patient and Dania 
[daughter] is aware and agreeable with discharge plans.

## 2017-06-20 NOTE — NUR
PATIENT DISCHARGED PICKED UP BY HIS PRIVATE CARE GIVER BERNA IN SATISFACTORY CONDITION WITH 
DISCHARGE INSTRUCTIONS AND PRESCRIPTIONS AND PATIENT INSTRUCTED TO CONTINUE WITH ANTIBIOTICS 
AS ORDERED AND CALL HIS PRIMARY DOCTOR WITHIN THE NEXT ONE TO TWO WEEKS AND HE EXPRESSED 
UNDERSTANDING.

## 2017-06-20 NOTE — NUR
RECEIVED PATIENT IN BED WITH EYES CLOSED BUT EASILY OPENS EYES WHEN TOUCHED OR NAME IS 
CALLED TOTALLY DEPENDENT FOR ALL ADL.TURNED AND REPOSITIONED Q2H.TX AS ORDERED MADE 
COMFORTABLE NOT IN DISTRESS AT THIS TIME.

## 2017-06-20 NOTE — NUR
NEW ORDER NOTED TO DISCHARGE PATIENT HOME WITH HOME HEALTH HIS DAUGHTER AND CARE GIVER BERNA RAMIREZ.

## 2018-01-03 ENCOUNTER — HOSPITAL ENCOUNTER (INPATIENT)
Dept: HOSPITAL 12 - ER | Age: 78
LOS: 18 days | Discharge: NURSING, REHAB OR LONG-TERM CARE | DRG: 871 | End: 2018-01-21
Payer: MEDICARE

## 2018-01-03 VITALS — WEIGHT: 179.03 LBS | BODY MASS INDEX: 24.25 KG/M2 | HEIGHT: 72 IN

## 2018-01-03 DIAGNOSIS — E87.2: ICD-10-CM

## 2018-01-03 DIAGNOSIS — K76.0: ICD-10-CM

## 2018-01-03 DIAGNOSIS — E44.0: ICD-10-CM

## 2018-01-03 DIAGNOSIS — E66.9: ICD-10-CM

## 2018-01-03 DIAGNOSIS — Z86.73: ICD-10-CM

## 2018-01-03 DIAGNOSIS — Z90.79: ICD-10-CM
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
## 2018-01-03 DIAGNOSIS — E87.0: ICD-10-CM

## 2018-01-03 DIAGNOSIS — Z95.1: ICD-10-CM

## 2018-01-03 DIAGNOSIS — Z79.82: ICD-10-CM

## 2018-01-03 DIAGNOSIS — I25.10: ICD-10-CM

## 2018-01-03 DIAGNOSIS — B37.49: ICD-10-CM

## 2018-01-03 DIAGNOSIS — Z79.899: ICD-10-CM

## 2018-01-03 DIAGNOSIS — I11.0: ICD-10-CM

## 2018-01-03 DIAGNOSIS — Z87.442: ICD-10-CM

## 2018-01-03 DIAGNOSIS — E78.00: ICD-10-CM

## 2018-01-03 DIAGNOSIS — F03.90: ICD-10-CM

## 2018-01-03 DIAGNOSIS — G93.40: ICD-10-CM

## 2018-01-03 DIAGNOSIS — S00.572A: ICD-10-CM

## 2018-01-03 DIAGNOSIS — K21.9: ICD-10-CM

## 2018-01-03 DIAGNOSIS — G47.33: ICD-10-CM
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
## 2018-01-03 DIAGNOSIS — R53.2: ICD-10-CM

## 2018-01-03 DIAGNOSIS — J69.0: ICD-10-CM

## 2018-01-03 DIAGNOSIS — R06.03: ICD-10-CM

## 2018-01-03 DIAGNOSIS — R65.20: ICD-10-CM

## 2018-01-03 DIAGNOSIS — I70.0: ICD-10-CM

## 2018-01-03 DIAGNOSIS — N17.0: ICD-10-CM

## 2018-01-03 DIAGNOSIS — Z66: ICD-10-CM

## 2018-01-03 DIAGNOSIS — N40.0: ICD-10-CM

## 2018-01-03 DIAGNOSIS — G20: ICD-10-CM

## 2018-01-03 DIAGNOSIS — I50.33: ICD-10-CM

## 2018-01-03 DIAGNOSIS — D64.9: ICD-10-CM

## 2018-01-03 DIAGNOSIS — Z87.440: ICD-10-CM

## 2018-01-03 DIAGNOSIS — Z79.84: ICD-10-CM

## 2018-01-03 DIAGNOSIS — K44.9: ICD-10-CM

## 2018-01-03 DIAGNOSIS — R13.10: ICD-10-CM

## 2018-01-03 DIAGNOSIS — K59.00: ICD-10-CM

## 2018-01-03 DIAGNOSIS — S30.0XXA: ICD-10-CM

## 2018-01-03 DIAGNOSIS — D68.59: ICD-10-CM

## 2018-01-03 DIAGNOSIS — S90.821A: ICD-10-CM

## 2018-01-03 DIAGNOSIS — A41.9: Primary | ICD-10-CM

## 2018-01-03 DIAGNOSIS — M48.9: ICD-10-CM

## 2018-01-03 DIAGNOSIS — E87.6: ICD-10-CM

## 2018-01-03 DIAGNOSIS — E11.9: ICD-10-CM
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
## 2018-01-03 PROCEDURE — A4217 STERILE WATER/SALINE, 500 ML: HCPCS

## 2018-01-03 PROCEDURE — A4663 DIALYSIS BLOOD PRESSURE CUFF: HCPCS

## 2018-01-03 PROCEDURE — P9021 RED BLOOD CELLS UNIT: HCPCS

## 2018-01-04 VITALS — DIASTOLIC BLOOD PRESSURE: 59 MMHG | SYSTOLIC BLOOD PRESSURE: 92 MMHG

## 2018-01-04 VITALS — SYSTOLIC BLOOD PRESSURE: 112 MMHG | DIASTOLIC BLOOD PRESSURE: 69 MMHG

## 2018-01-04 VITALS — SYSTOLIC BLOOD PRESSURE: 99 MMHG | DIASTOLIC BLOOD PRESSURE: 51 MMHG

## 2018-01-04 VITALS — DIASTOLIC BLOOD PRESSURE: 55 MMHG | SYSTOLIC BLOOD PRESSURE: 100 MMHG

## 2018-01-04 LAB
ALP SERPL-CCNC: 48 U/L (ref 50–136)
ALT SERPL W/O P-5'-P-CCNC: 16 U/L (ref 16–63)
APPEARANCE UR: (no result)
APPEARANCE UR: CLEAR
AST SERPL-CCNC: 15 U/L (ref 15–37)
BASE EXCESS BLDA CALC-SCNC: -7.3 MMOL/L
BASOPHILS # BLD AUTO: 0 K/UL (ref 0–8)
BASOPHILS # BLD AUTO: 0 K/UL (ref 0–8)
BASOPHILS NFR BLD AUTO: 0 % (ref 0–2)
BASOPHILS NFR BLD AUTO: 0.2 % (ref 0–2)
BILIRUB DIRECT SERPL-MCNC: 0.1 MG/DL (ref 0–0.2)
BILIRUB SERPL-MCNC: 0.5 MG/DL (ref 0.2–1)
BILIRUB UR QL STRIP: NEGATIVE
BILIRUB UR QL STRIP: NEGATIVE
BUN SERPL-MCNC: 34 MG/DL (ref 7–18)
BUN SERPL-MCNC: 36 MG/DL (ref 7–18)
CHLORIDE SERPL-SCNC: 100 MMOL/L (ref 98–107)
CHLORIDE SERPL-SCNC: 107 MMOL/L (ref 98–107)
CO2 SERPL-SCNC: 19 MMOL/L (ref 21–32)
CO2 SERPL-SCNC: 23 MMOL/L (ref 21–32)
COLOR UR: (no result)
COLOR UR: YELLOW
CREAT SERPL-MCNC: 1.5 MG/DL (ref 0.6–1.3)
CREAT SERPL-MCNC: 2.3 MG/DL (ref 0.6–1.3)
CREAT UR-MCNC: 42.9 MG/DL (ref 30–125)
DEPRECATED SQUAMOUS URNS QL MICRO: (no result) /HPF
EOSINOPHIL # BLD AUTO: 0 K/UL (ref 0–0.7)
EOSINOPHIL # BLD AUTO: 0 K/UL (ref 0–0.7)
EOSINOPHIL NFR BLD AUTO: 0 % (ref 0–7)
EOSINOPHIL NFR BLD AUTO: 0.1 % (ref 0–7)
GLUCOSE SERPL-MCNC: 193 MG/DL (ref 74–106)
GLUCOSE SERPL-MCNC: 89 MG/DL (ref 74–106)
GLUCOSE UR STRIP-MCNC: NEGATIVE MG/DL
GLUCOSE UR STRIP-MCNC: NEGATIVE MG/DL
HCO3 BLDA-SCNC: 16.6 MMOL/L
HCT VFR BLD AUTO: 25 % (ref 36.7–47.1)
HCT VFR BLD AUTO: 32.5 % (ref 36.7–47.1)
HGB BLD-MCNC: 10.9 G/DL (ref 12.5–16.3)
HGB BLD-MCNC: 8.5 G/DL (ref 12.5–16.3)
HGB BLDA OXIMETRY-MCNC: 10.5 G/DL (ref 13.5–18)
HGB UR QL STRIP: (no result)
HGB UR QL STRIP: NEGATIVE
INHALED O2 CONCENTRATION: 100 %
KETONES UR STRIP-MCNC: (no result) MG/DL
KETONES UR STRIP-MCNC: NEGATIVE MG/DL
LEUKOCYTE ESTERASE UR QL STRIP: (no result)
LEUKOCYTE ESTERASE UR QL STRIP: NEGATIVE
LYMPHOCYTES # BLD AUTO: 0.3 K/UL (ref 20–40)
LYMPHOCYTES # BLD AUTO: 0.3 K/UL (ref 20–40)
LYMPHOCYTES NFR BLD AUTO: 4.4 % (ref 20.5–51.5)
LYMPHOCYTES NFR BLD AUTO: 4.5 % (ref 20.5–51.5)
MAGNESIUM SERPL-MCNC: 1.9 MG/DL (ref 1.8–2.4)
MCH RBC QN AUTO: 30.8 UUG (ref 23.8–33.4)
MCH RBC QN AUTO: 31.2 UUG (ref 23.8–33.4)
MCHC RBC AUTO-ENTMCNC: 34 G/DL (ref 32.5–36.3)
MCHC RBC AUTO-ENTMCNC: 34 G/DL (ref 32.5–36.3)
MCV RBC AUTO: 91.2 FL (ref 73–96.2)
MCV RBC AUTO: 92.1 FL (ref 73–96.2)
MONOCYTES # BLD AUTO: 0.2 K/UL (ref 2–10)
MONOCYTES # BLD AUTO: 0.2 K/UL (ref 2–10)
MONOCYTES NFR BLD AUTO: 2.7 % (ref 0–11)
MONOCYTES NFR BLD AUTO: 3.4 % (ref 0–11)
MUCOUS THREADS URNS QL MICRO: (no result) /LPF
NEUTROPHILS # BLD AUTO: 5.8 K/UL (ref 1.8–8.9)
NEUTROPHILS # BLD AUTO: 6 K/UL (ref 1.8–8.9)
NEUTROPHILS NFR BLD AUTO: 91.9 % (ref 38.5–71.5)
NEUTROPHILS NFR BLD AUTO: 92.8 % (ref 38.5–71.5)
NITRITE UR QL STRIP: NEGATIVE
NITRITE UR QL STRIP: NEGATIVE
PCO2 TEMP ADJ BLDA: 28.2 MMHG (ref 35–45)
PH TEMP ADJ BLDA: 7.39 [PH] (ref 7.35–7.45)
PH UR STRIP: 5.5 [PH] (ref 5–8)
PH UR STRIP: 5.5 [PH] (ref 5–8)
PHOSPHATE SERPL-MCNC: 3.3 MG/DL (ref 2.5–4.9)
PLATELET # BLD AUTO: 196 K/UL (ref 152–348)
PLATELET # BLD AUTO: 244 K/UL (ref 152–348)
PO2 TEMP ADJ BLDA: 114.3 MMHG (ref 75–100)
POTASSIUM SERPL-SCNC: 3.9 MMOL/L (ref 3.5–5.1)
POTASSIUM SERPL-SCNC: 4.3 MMOL/L (ref 3.5–5.1)
PROT UR QL STRIP: (no result)
PROT UR QL STRIP: NEGATIVE
PROT UR-MCNC: 47.2 MG/DL
RBC # BLD AUTO: 2.74 MIL/UL (ref 4.06–5.63)
RBC # BLD AUTO: 3.54 MIL/UL (ref 4.06–5.63)
RBC #/AREA URNS HPF: (no result) /HPF (ref 0–3)
RBC #/AREA URNS HPF: (no result) /HPF (ref 0–3)
SP GR UR STRIP: 1.01 (ref 1–1.03)
SP GR UR STRIP: 1.02 (ref 1–1.03)
SPECIMEN DRAWN FROM PATIENT: (no result)
UROBILINOGEN UR STRIP-MCNC: 0.2 E.U./DL
UROBILINOGEN UR STRIP-MCNC: 0.2 E.U./DL
WBC # BLD AUTO: 6.3 K/UL (ref 3.6–10.2)
WBC # BLD AUTO: 6.6 K/UL (ref 3.6–10.2)
WBC #/AREA URNS HPF: (no result) /HPF
WBC #/AREA URNS HPF: (no result) /HPF (ref 0–3)
WBC #/AREA URNS HPF: (no result) /HPF (ref 0–3)
WS STN SPEC: 7.8 G/DL (ref 6.4–8.2)

## 2018-01-04 RX ADMIN — CARBIDOPA AND LEVODOPA SCH TAB.SA: 25; 100 TABLET, EXTENDED RELEASE ORAL at 20:17

## 2018-01-04 RX ADMIN — PANTOPRAZOLE SODIUM SCH MG: 40 TABLET, DELAYED RELEASE ORAL at 08:41

## 2018-01-04 RX ADMIN — TAZOBACTAM SODIUM AND PIPERACILLIN SODIUM SCH MLS/HR: 250; 2 INJECTION, SOLUTION INTRAVENOUS at 18:34

## 2018-01-04 RX ADMIN — ALBUTEROL SULFATE SCH MG: 2.5 SOLUTION RESPIRATORY (INHALATION) at 17:57

## 2018-01-04 RX ADMIN — ACETAMINOPHEN PRN MG: 650 SUPPOSITORY RECTAL at 14:02

## 2018-01-04 RX ADMIN — ALBUTEROL SULFATE PRN MG: 2.5 SOLUTION RESPIRATORY (INHALATION) at 22:51

## 2018-01-04 RX ADMIN — TAZOBACTAM SODIUM AND PIPERACILLIN SODIUM SCH MLS/HR: 250; 2 INJECTION, SOLUTION INTRAVENOUS at 09:53

## 2018-01-04 RX ADMIN — Medication SCH EACH: at 20:48

## 2018-01-04 RX ADMIN — CARBIDOPA AND LEVODOPA SCH TAB: 25; 250 TABLET ORAL at 20:17

## 2018-01-04 RX ADMIN — BETHANECHOL CHLORIDE SCH MG: 25 TABLET ORAL at 11:25

## 2018-01-04 RX ADMIN — Medication SCH EA: at 20:20

## 2018-01-04 RX ADMIN — BETHANECHOL CHLORIDE SCH MG: 25 TABLET ORAL at 17:00

## 2018-01-04 RX ADMIN — CARBIDOPA AND LEVODOPA SCH TAB: 25; 250 TABLET ORAL at 17:00

## 2018-01-04 RX ADMIN — METOPROLOL SUCCINATE SCH MG: 50 TABLET, FILM COATED, EXTENDED RELEASE ORAL at 20:17

## 2018-01-04 RX ADMIN — ASPIRIN SCH MG: 81 TABLET, COATED ORAL at 11:25

## 2018-01-04 RX ADMIN — IPRATROPIUM BROMIDE PRN MG: 0.5 SOLUTION RESPIRATORY (INHALATION) at 22:52

## 2018-01-04 RX ADMIN — Medication SCH EACH: at 16:22

## 2018-01-04 RX ADMIN — CARBIDOPA AND LEVODOPA SCH TAB: 25; 250 TABLET ORAL at 14:08

## 2018-01-04 RX ADMIN — IPRATROPIUM BROMIDE SCH MG: 0.5 SOLUTION RESPIRATORY (INHALATION) at 17:57

## 2018-01-04 RX ADMIN — BETHANECHOL CHLORIDE SCH MG: 25 TABLET ORAL at 14:08

## 2018-01-04 RX ADMIN — ACETYLCYSTEINE SCH MG: 100 INHALANT RESPIRATORY (INHALATION) at 15:30

## 2018-01-04 RX ADMIN — CARBIDOPA AND LEVODOPA SCH TAB: 25; 250 TABLET ORAL at 11:25

## 2018-01-04 RX ADMIN — ACETYLCYSTEINE SCH MG: 100 INHALANT RESPIRATORY (INHALATION) at 22:51

## 2018-01-04 RX ADMIN — Medication SCH EACH: at 08:41

## 2018-01-04 RX ADMIN — Medication SCH EACH: at 11:29

## 2018-01-04 RX ADMIN — ALBUTEROL SULFATE SCH MG: 2.5 SOLUTION RESPIRATORY (INHALATION) at 15:30

## 2018-01-04 RX ADMIN — SODIUM CHLORIDE PRN MLS/HR: 0.9 INJECTION, SOLUTION INTRAVENOUS at 08:42

## 2018-01-04 RX ADMIN — IPRATROPIUM BROMIDE SCH MG: 0.5 SOLUTION RESPIRATORY (INHALATION) at 15:30

## 2018-01-05 VITALS — DIASTOLIC BLOOD PRESSURE: 67 MMHG | SYSTOLIC BLOOD PRESSURE: 109 MMHG

## 2018-01-05 VITALS — SYSTOLIC BLOOD PRESSURE: 122 MMHG | DIASTOLIC BLOOD PRESSURE: 60 MMHG

## 2018-01-05 VITALS — DIASTOLIC BLOOD PRESSURE: 71 MMHG | SYSTOLIC BLOOD PRESSURE: 136 MMHG

## 2018-01-05 VITALS — DIASTOLIC BLOOD PRESSURE: 70 MMHG | SYSTOLIC BLOOD PRESSURE: 133 MMHG

## 2018-01-05 VITALS — DIASTOLIC BLOOD PRESSURE: 60 MMHG | SYSTOLIC BLOOD PRESSURE: 100 MMHG

## 2018-01-05 LAB
ALP SERPL-CCNC: 39 U/L (ref 50–136)
ALT SERPL W/O P-5'-P-CCNC: 9 U/L (ref 16–63)
AST SERPL-CCNC: 43 U/L (ref 15–37)
BASOPHILS # BLD AUTO: 0 K/UL (ref 0–8)
BASOPHILS NFR BLD AUTO: 0.1 % (ref 0–2)
BILIRUB SERPL-MCNC: 0.3 MG/DL (ref 0.2–1)
BUN SERPL-MCNC: 32 MG/DL (ref 7–18)
CHLORIDE SERPL-SCNC: 106 MMOL/L (ref 98–107)
CO2 SERPL-SCNC: 27 MMOL/L (ref 21–32)
CREAT SERPL-MCNC: 1.4 MG/DL (ref 0.6–1.3)
EOSINOPHIL # BLD AUTO: 0 K/UL (ref 0–0.7)
EOSINOPHIL NFR BLD AUTO: 0.1 % (ref 0–7)
GLUCOSE SERPL-MCNC: 90 MG/DL (ref 74–106)
HCT VFR BLD AUTO: 25.2 % (ref 36.7–47.1)
HGB BLD-MCNC: 8.4 G/DL (ref 12.5–16.3)
LYMPHOCYTES # BLD AUTO: 0.2 K/UL (ref 20–40)
LYMPHOCYTES NFR BLD AUTO: 4.1 % (ref 20.5–51.5)
MAGNESIUM SERPL-MCNC: 1.9 MG/DL (ref 1.8–2.4)
MCH RBC QN AUTO: 30.6 UUG (ref 23.8–33.4)
MCHC RBC AUTO-ENTMCNC: 34 G/DL (ref 32.5–36.3)
MCV RBC AUTO: 91.3 FL (ref 73–96.2)
MONOCYTES # BLD AUTO: 0.1 K/UL (ref 2–10)
MONOCYTES NFR BLD AUTO: 2.8 % (ref 0–11)
NEUTROPHILS # BLD AUTO: 4.4 K/UL (ref 1.8–8.9)
NEUTROPHILS NFR BLD AUTO: 92.9 % (ref 38.5–71.5)
PHOSPHATE SERPL-MCNC: 3.7 MG/DL (ref 2.5–4.9)
PLATELET # BLD AUTO: 179 K/UL (ref 152–348)
POTASSIUM SERPL-SCNC: 3.7 MMOL/L (ref 3.5–5.1)
RBC # BLD AUTO: 2.76 MIL/UL (ref 4.06–5.63)
WBC # BLD AUTO: 4.8 K/UL (ref 3.6–10.2)
WS STN SPEC: 6.4 G/DL (ref 6.4–8.2)

## 2018-01-05 RX ADMIN — ACETYLCYSTEINE SCH MG: 100 INHALANT RESPIRATORY (INHALATION) at 15:40

## 2018-01-05 RX ADMIN — ALBUTEROL SULFATE SCH MG: 2.5 SOLUTION RESPIRATORY (INHALATION) at 15:41

## 2018-01-05 RX ADMIN — ASPIRIN SCH MG: 81 TABLET, COATED ORAL at 09:04

## 2018-01-05 RX ADMIN — TAZOBACTAM SODIUM AND PIPERACILLIN SODIUM SCH MLS/HR: 250; 2 INJECTION, SOLUTION INTRAVENOUS at 05:09

## 2018-01-05 RX ADMIN — BETHANECHOL CHLORIDE SCH MG: 25 TABLET ORAL at 17:00

## 2018-01-05 RX ADMIN — Medication SCH EACH: at 17:05

## 2018-01-05 RX ADMIN — ACETYLCYSTEINE SCH MG: 100 INHALANT RESPIRATORY (INHALATION) at 08:23

## 2018-01-05 RX ADMIN — BETHANECHOL CHLORIDE SCH MG: 25 TABLET ORAL at 12:12

## 2018-01-05 RX ADMIN — METOPROLOL SUCCINATE SCH MG: 50 TABLET, FILM COATED, EXTENDED RELEASE ORAL at 21:00

## 2018-01-05 RX ADMIN — ALBUTEROL SULFATE SCH MG: 2.5 SOLUTION RESPIRATORY (INHALATION) at 07:35

## 2018-01-05 RX ADMIN — Medication SCH EA: at 21:00

## 2018-01-05 RX ADMIN — ACETAMINOPHEN PRN MG: 650 SUPPOSITORY RECTAL at 17:47

## 2018-01-05 RX ADMIN — ACETYLCYSTEINE SCH MG: 100 INHALANT RESPIRATORY (INHALATION) at 20:14

## 2018-01-05 RX ADMIN — IPRATROPIUM BROMIDE SCH MG: 0.5 SOLUTION RESPIRATORY (INHALATION) at 20:13

## 2018-01-05 RX ADMIN — TAZOBACTAM SODIUM AND PIPERACILLIN SODIUM SCH MLS/HR: 375; 3 INJECTION, SOLUTION INTRAVENOUS at 17:43

## 2018-01-05 RX ADMIN — ALBUTEROL SULFATE SCH MG: 2.5 SOLUTION RESPIRATORY (INHALATION) at 11:55

## 2018-01-05 RX ADMIN — ALBUTEROL SULFATE SCH MG: 2.5 SOLUTION RESPIRATORY (INHALATION) at 08:23

## 2018-01-05 RX ADMIN — SODIUM CHLORIDE PRN MLS/HR: 0.9 INJECTION, SOLUTION INTRAVENOUS at 20:08

## 2018-01-05 RX ADMIN — CARBIDOPA AND LEVODOPA SCH TAB.SA: 25; 100 TABLET, EXTENDED RELEASE ORAL at 21:00

## 2018-01-05 RX ADMIN — BETHANECHOL CHLORIDE SCH MG: 25 TABLET ORAL at 09:04

## 2018-01-05 RX ADMIN — TAZOBACTAM SODIUM AND PIPERACILLIN SODIUM SCH MLS/HR: 250; 2 INJECTION, SOLUTION INTRAVENOUS at 00:00

## 2018-01-05 RX ADMIN — IPRATROPIUM BROMIDE SCH MG: 0.5 SOLUTION RESPIRATORY (INHALATION) at 07:35

## 2018-01-05 RX ADMIN — IPRATROPIUM BROMIDE SCH MG: 0.5 SOLUTION RESPIRATORY (INHALATION) at 08:23

## 2018-01-05 RX ADMIN — CARBIDOPA AND LEVODOPA SCH TAB: 25; 250 TABLET ORAL at 17:00

## 2018-01-05 RX ADMIN — CARBIDOPA AND LEVODOPA SCH TAB: 25; 250 TABLET ORAL at 12:12

## 2018-01-05 RX ADMIN — CARBIDOPA AND LEVODOPA SCH TAB: 25; 250 TABLET ORAL at 21:00

## 2018-01-05 RX ADMIN — ALBUTEROL SULFATE SCH MG: 2.5 SOLUTION RESPIRATORY (INHALATION) at 20:13

## 2018-01-05 RX ADMIN — ACETAMINOPHEN PRN MG: 650 SUPPOSITORY RECTAL at 01:27

## 2018-01-05 RX ADMIN — IPRATROPIUM BROMIDE SCH MG: 0.5 SOLUTION RESPIRATORY (INHALATION) at 15:41

## 2018-01-05 RX ADMIN — Medication SCH EACH: at 11:45

## 2018-01-05 RX ADMIN — CARBIDOPA AND LEVODOPA SCH TAB: 25; 250 TABLET ORAL at 09:05

## 2018-01-05 RX ADMIN — SODIUM CHLORIDE PRN MLS/HR: 0.9 INJECTION, SOLUTION INTRAVENOUS at 02:36

## 2018-01-05 RX ADMIN — Medication SCH EACH: at 06:38

## 2018-01-05 RX ADMIN — IPRATROPIUM BROMIDE SCH MG: 0.5 SOLUTION RESPIRATORY (INHALATION) at 11:55

## 2018-01-05 RX ADMIN — PANTOPRAZOLE SODIUM SCH MG: 40 TABLET, DELAYED RELEASE ORAL at 06:38

## 2018-01-05 RX ADMIN — TAZOBACTAM SODIUM AND PIPERACILLIN SODIUM SCH MLS/HR: 375; 3 INJECTION, SOLUTION INTRAVENOUS at 12:11

## 2018-01-06 VITALS — SYSTOLIC BLOOD PRESSURE: 121 MMHG | DIASTOLIC BLOOD PRESSURE: 61 MMHG

## 2018-01-06 VITALS — SYSTOLIC BLOOD PRESSURE: 140 MMHG | DIASTOLIC BLOOD PRESSURE: 71 MMHG

## 2018-01-06 VITALS — SYSTOLIC BLOOD PRESSURE: 120 MMHG | DIASTOLIC BLOOD PRESSURE: 75 MMHG

## 2018-01-06 VITALS — SYSTOLIC BLOOD PRESSURE: 141 MMHG | DIASTOLIC BLOOD PRESSURE: 55 MMHG

## 2018-01-06 VITALS — DIASTOLIC BLOOD PRESSURE: 65 MMHG | SYSTOLIC BLOOD PRESSURE: 127 MMHG

## 2018-01-06 LAB
ALP SERPL-CCNC: 40 U/L (ref 50–136)
ALT SERPL W/O P-5'-P-CCNC: 23 U/L (ref 16–63)
AST SERPL-CCNC: 46 U/L (ref 15–37)
BASOPHILS # BLD AUTO: 0 K/UL (ref 0–8)
BASOPHILS NFR BLD AUTO: 0.1 % (ref 0–2)
BILIRUB SERPL-MCNC: 0.4 MG/DL (ref 0.2–1)
BUN SERPL-MCNC: 23 MG/DL (ref 7–18)
CHLORIDE SERPL-SCNC: 108 MMOL/L (ref 98–107)
CO2 SERPL-SCNC: 25 MMOL/L (ref 21–32)
CREAT SERPL-MCNC: 1.2 MG/DL (ref 0.6–1.3)
EOSINOPHIL # BLD AUTO: 0 K/UL (ref 0–0.7)
EOSINOPHIL NFR BLD AUTO: 0 % (ref 0–7)
FERRITIN SERPL-MCNC: 566 NG/ML (ref 26–388)
GLUCOSE SERPL-MCNC: 184 MG/DL (ref 74–106)
HCT VFR BLD AUTO: 24.9 % (ref 36.7–47.1)
HGB BLD-MCNC: 8.4 G/DL (ref 12.5–16.3)
IRON SERPL-MCNC: 9 UG/DL (ref 50–175)
LYMPHOCYTES # BLD AUTO: 0.2 K/UL (ref 20–40)
LYMPHOCYTES NFR BLD AUTO: 6.4 % (ref 20.5–51.5)
MAGNESIUM SERPL-MCNC: 1.9 MG/DL (ref 1.8–2.4)
MCH RBC QN AUTO: 30.8 UUG (ref 23.8–33.4)
MCHC RBC AUTO-ENTMCNC: 34 G/DL (ref 32.5–36.3)
MCV RBC AUTO: 91.4 FL (ref 73–96.2)
MONOCYTES # BLD AUTO: 0.2 K/UL (ref 2–10)
MONOCYTES NFR BLD AUTO: 4.4 % (ref 0–11)
NEUTROPHILS # BLD AUTO: 3.2 K/UL (ref 1.8–8.9)
NEUTROPHILS NFR BLD AUTO: 89.1 % (ref 38.5–71.5)
PHOSPHATE SERPL-MCNC: 3.3 MG/DL (ref 2.5–4.9)
PLATELET # BLD AUTO: 191 K/UL (ref 152–348)
POTASSIUM SERPL-SCNC: 3.4 MMOL/L (ref 3.5–5.1)
RBC # BLD AUTO: 2.72 MIL/UL (ref 4.06–5.63)
WBC # BLD AUTO: 3.6 K/UL (ref 3.6–10.2)
WS STN SPEC: 6.6 G/DL (ref 6.4–8.2)

## 2018-01-06 RX ADMIN — Medication SCH EA: at 21:00

## 2018-01-06 RX ADMIN — CARBIDOPA AND LEVODOPA SCH TAB: 25; 250 TABLET ORAL at 12:27

## 2018-01-06 RX ADMIN — CARBIDOPA AND LEVODOPA SCH TAB.SA: 25; 100 TABLET, EXTENDED RELEASE ORAL at 21:21

## 2018-01-06 RX ADMIN — DEXTROSE SCH MLS/HR: 50 INJECTION, SOLUTION INTRAVENOUS at 11:29

## 2018-01-06 RX ADMIN — SODIUM CHLORIDE PRN UNIT: 9 INJECTION, SOLUTION INTRAVENOUS at 17:22

## 2018-01-06 RX ADMIN — BETHANECHOL CHLORIDE SCH MG: 25 TABLET ORAL at 17:00

## 2018-01-06 RX ADMIN — BETHANECHOL CHLORIDE SCH MG: 25 TABLET ORAL at 09:00

## 2018-01-06 RX ADMIN — ASPIRIN SCH MG: 81 TABLET, COATED ORAL at 09:00

## 2018-01-06 RX ADMIN — IPRATROPIUM BROMIDE PRN MG: 0.5 SOLUTION RESPIRATORY (INHALATION) at 03:06

## 2018-01-06 RX ADMIN — Medication SCH EA: at 21:20

## 2018-01-06 RX ADMIN — ALBUTEROL SULFATE SCH MG: 2.5 SOLUTION RESPIRATORY (INHALATION) at 15:51

## 2018-01-06 RX ADMIN — ALBUTEROL SULFATE SCH MG: 2.5 SOLUTION RESPIRATORY (INHALATION) at 07:46

## 2018-01-06 RX ADMIN — TAZOBACTAM SODIUM AND PIPERACILLIN SODIUM SCH MLS/HR: 375; 3 INJECTION, SOLUTION INTRAVENOUS at 21:20

## 2018-01-06 RX ADMIN — CARBIDOPA AND LEVODOPA SCH TAB: 25; 250 TABLET ORAL at 21:00

## 2018-01-06 RX ADMIN — POTASSIUM CHLORIDE SCH MLS/HR: 14.9 INJECTION, SOLUTION INTRAVENOUS at 18:46

## 2018-01-06 RX ADMIN — IPRATROPIUM BROMIDE SCH MG: 0.5 SOLUTION RESPIRATORY (INHALATION) at 15:51

## 2018-01-06 RX ADMIN — CARBIDOPA AND LEVODOPA SCH TAB: 25; 250 TABLET ORAL at 21:21

## 2018-01-06 RX ADMIN — Medication SCH EACH: at 06:26

## 2018-01-06 RX ADMIN — POTASSIUM CHLORIDE SCH MLS/HR: 14.9 INJECTION, SOLUTION INTRAVENOUS at 17:42

## 2018-01-06 RX ADMIN — CARBIDOPA AND LEVODOPA SCH TAB.SA: 25; 100 TABLET, EXTENDED RELEASE ORAL at 21:00

## 2018-01-06 RX ADMIN — TAZOBACTAM SODIUM AND PIPERACILLIN SODIUM SCH MLS/HR: 375; 3 INJECTION, SOLUTION INTRAVENOUS at 00:40

## 2018-01-06 RX ADMIN — METOPROLOL SUCCINATE SCH MG: 50 TABLET, FILM COATED, EXTENDED RELEASE ORAL at 21:22

## 2018-01-06 RX ADMIN — ALBUTEROL SULFATE PRN MG: 2.5 SOLUTION RESPIRATORY (INHALATION) at 03:07

## 2018-01-06 RX ADMIN — ALBUTEROL SULFATE SCH MG: 2.5 SOLUTION RESPIRATORY (INHALATION) at 22:50

## 2018-01-06 RX ADMIN — ACETYLCYSTEINE SCH MG: 100 INHALANT RESPIRATORY (INHALATION) at 07:46

## 2018-01-06 RX ADMIN — CARBIDOPA AND LEVODOPA SCH TAB: 25; 250 TABLET ORAL at 09:00

## 2018-01-06 RX ADMIN — IPRATROPIUM BROMIDE SCH MG: 0.5 SOLUTION RESPIRATORY (INHALATION) at 07:46

## 2018-01-06 RX ADMIN — ACETYLCYSTEINE SCH MG: 100 INHALANT RESPIRATORY (INHALATION) at 15:51

## 2018-01-06 RX ADMIN — SODIUM CHLORIDE PRN UNIT: 9 INJECTION, SOLUTION INTRAVENOUS at 06:28

## 2018-01-06 RX ADMIN — IPRATROPIUM BROMIDE SCH MG: 0.5 SOLUTION RESPIRATORY (INHALATION) at 11:11

## 2018-01-06 RX ADMIN — TAZOBACTAM SODIUM AND PIPERACILLIN SODIUM SCH MLS/HR: 375; 3 INJECTION, SOLUTION INTRAVENOUS at 05:50

## 2018-01-06 RX ADMIN — METOPROLOL SUCCINATE SCH MG: 50 TABLET, FILM COATED, EXTENDED RELEASE ORAL at 21:00

## 2018-01-06 RX ADMIN — ACETYLCYSTEINE SCH MG: 100 INHALANT RESPIRATORY (INHALATION) at 22:50

## 2018-01-06 RX ADMIN — ALBUTEROL SULFATE SCH MG: 2.5 SOLUTION RESPIRATORY (INHALATION) at 11:11

## 2018-01-06 RX ADMIN — SODIUM CHLORIDE PRN UNIT: 9 INJECTION, SOLUTION INTRAVENOUS at 13:05

## 2018-01-06 RX ADMIN — TAZOBACTAM SODIUM AND PIPERACILLIN SODIUM SCH MLS/HR: 375; 3 INJECTION, SOLUTION INTRAVENOUS at 12:35

## 2018-01-06 RX ADMIN — BETHANECHOL CHLORIDE SCH MG: 25 TABLET ORAL at 12:27

## 2018-01-06 RX ADMIN — Medication SCH EACH: at 17:18

## 2018-01-06 RX ADMIN — PANTOPRAZOLE SODIUM SCH MG: 40 TABLET, DELAYED RELEASE ORAL at 06:50

## 2018-01-06 RX ADMIN — CARBIDOPA AND LEVODOPA SCH TAB: 25; 250 TABLET ORAL at 17:00

## 2018-01-06 RX ADMIN — Medication SCH EACH: at 12:25

## 2018-01-06 RX ADMIN — IPRATROPIUM BROMIDE SCH MG: 0.5 SOLUTION RESPIRATORY (INHALATION) at 22:50

## 2018-01-06 RX ADMIN — SODIUM CHLORIDE PRN MLS/HR: 0.9 INJECTION, SOLUTION INTRAVENOUS at 12:26

## 2018-01-07 VITALS — SYSTOLIC BLOOD PRESSURE: 111 MMHG | DIASTOLIC BLOOD PRESSURE: 58 MMHG

## 2018-01-07 VITALS — DIASTOLIC BLOOD PRESSURE: 66 MMHG | SYSTOLIC BLOOD PRESSURE: 131 MMHG

## 2018-01-07 VITALS — DIASTOLIC BLOOD PRESSURE: 68 MMHG | SYSTOLIC BLOOD PRESSURE: 137 MMHG

## 2018-01-07 VITALS — DIASTOLIC BLOOD PRESSURE: 69 MMHG | SYSTOLIC BLOOD PRESSURE: 139 MMHG

## 2018-01-07 RX ADMIN — CARBIDOPA AND LEVODOPA SCH TAB: 25; 250 TABLET ORAL at 09:00

## 2018-01-07 RX ADMIN — TAZOBACTAM SODIUM AND PIPERACILLIN SODIUM SCH MLS/HR: 375; 3 INJECTION, SOLUTION INTRAVENOUS at 23:29

## 2018-01-07 RX ADMIN — Medication SCH EACH: at 16:56

## 2018-01-07 RX ADMIN — ACETYLCYSTEINE SCH MG: 100 INHALANT RESPIRATORY (INHALATION) at 07:25

## 2018-01-07 RX ADMIN — ASPIRIN SCH MG: 81 TABLET, COATED ORAL at 09:00

## 2018-01-07 RX ADMIN — Medication SCH EACH: at 06:36

## 2018-01-07 RX ADMIN — IPRATROPIUM BROMIDE SCH MG: 0.5 SOLUTION RESPIRATORY (INHALATION) at 07:25

## 2018-01-07 RX ADMIN — ACETYLCYSTEINE SCH MG: 100 INHALANT RESPIRATORY (INHALATION) at 22:27

## 2018-01-07 RX ADMIN — DEXTROSE SCH MLS/HR: 50 INJECTION, SOLUTION INTRAVENOUS at 06:36

## 2018-01-07 RX ADMIN — BETHANECHOL CHLORIDE SCH MG: 25 TABLET ORAL at 17:12

## 2018-01-07 RX ADMIN — IPRATROPIUM BROMIDE SCH MG: 0.5 SOLUTION RESPIRATORY (INHALATION) at 16:03

## 2018-01-07 RX ADMIN — TAZOBACTAM SODIUM AND PIPERACILLIN SODIUM SCH MLS/HR: 375; 3 INJECTION, SOLUTION INTRAVENOUS at 17:35

## 2018-01-07 RX ADMIN — TAZOBACTAM SODIUM AND PIPERACILLIN SODIUM SCH MLS/HR: 375; 3 INJECTION, SOLUTION INTRAVENOUS at 11:58

## 2018-01-07 RX ADMIN — ALBUTEROL SULFATE SCH MG: 2.5 SOLUTION RESPIRATORY (INHALATION) at 11:42

## 2018-01-07 RX ADMIN — METOPROLOL SUCCINATE SCH MG: 50 TABLET, FILM COATED, EXTENDED RELEASE ORAL at 21:06

## 2018-01-07 RX ADMIN — IPRATROPIUM BROMIDE PRN MG: 0.5 SOLUTION RESPIRATORY (INHALATION) at 22:27

## 2018-01-07 RX ADMIN — SODIUM CHLORIDE PRN MLS/HR: 0.9 INJECTION, SOLUTION INTRAVENOUS at 15:24

## 2018-01-07 RX ADMIN — ACETAMINOPHEN PRN MG: 650 SUPPOSITORY RECTAL at 00:59

## 2018-01-07 RX ADMIN — ALBUTEROL SULFATE SCH MG: 2.5 SOLUTION RESPIRATORY (INHALATION) at 19:31

## 2018-01-07 RX ADMIN — Medication SCH EACH: at 23:34

## 2018-01-07 RX ADMIN — SODIUM CHLORIDE PRN UNIT: 9 INJECTION, SOLUTION INTRAVENOUS at 01:02

## 2018-01-07 RX ADMIN — ALBUTEROL SULFATE SCH MG: 2.5 SOLUTION RESPIRATORY (INHALATION) at 16:03

## 2018-01-07 RX ADMIN — ALBUTEROL SULFATE SCH MG: 2.5 SOLUTION RESPIRATORY (INHALATION) at 07:25

## 2018-01-07 RX ADMIN — BETHANECHOL CHLORIDE SCH MG: 25 TABLET ORAL at 14:04

## 2018-01-07 RX ADMIN — CARBIDOPA AND LEVODOPA SCH TAB: 25; 250 TABLET ORAL at 21:08

## 2018-01-07 RX ADMIN — TAZOBACTAM SODIUM AND PIPERACILLIN SODIUM SCH MLS/HR: 375; 3 INJECTION, SOLUTION INTRAVENOUS at 05:30

## 2018-01-07 RX ADMIN — ALBUTEROL SULFATE PRN MG: 2.5 SOLUTION RESPIRATORY (INHALATION) at 22:27

## 2018-01-07 RX ADMIN — TAZOBACTAM SODIUM AND PIPERACILLIN SODIUM SCH MLS/HR: 375; 3 INJECTION, SOLUTION INTRAVENOUS at 00:27

## 2018-01-07 RX ADMIN — Medication SCH EA: at 21:25

## 2018-01-07 RX ADMIN — IPRATROPIUM BROMIDE SCH MG: 0.5 SOLUTION RESPIRATORY (INHALATION) at 19:31

## 2018-01-07 RX ADMIN — SODIUM CHLORIDE PRN UNIT: 9 INJECTION, SOLUTION INTRAVENOUS at 17:14

## 2018-01-07 RX ADMIN — PANTOPRAZOLE SODIUM SCH MG: 40 TABLET, DELAYED RELEASE ORAL at 06:29

## 2018-01-07 RX ADMIN — DEXTROSE SCH MLS/HR: 50 INJECTION, SOLUTION INTRAVENOUS at 05:28

## 2018-01-07 RX ADMIN — CARBIDOPA AND LEVODOPA SCH TAB: 25; 250 TABLET ORAL at 17:12

## 2018-01-07 RX ADMIN — BETHANECHOL CHLORIDE SCH MG: 25 TABLET ORAL at 09:00

## 2018-01-07 RX ADMIN — Medication SCH EACH: at 11:57

## 2018-01-07 RX ADMIN — ACETYLCYSTEINE SCH MG: 100 INHALANT RESPIRATORY (INHALATION) at 16:03

## 2018-01-07 RX ADMIN — Medication SCH EACH: at 21:07

## 2018-01-07 RX ADMIN — Medication SCH EACH: at 00:31

## 2018-01-07 RX ADMIN — CARBIDOPA AND LEVODOPA SCH TAB: 25; 250 TABLET ORAL at 14:03

## 2018-01-07 RX ADMIN — ASPIRIN SCH MG: 81 TABLET, COATED ORAL at 14:00

## 2018-01-07 RX ADMIN — IPRATROPIUM BROMIDE SCH MG: 0.5 SOLUTION RESPIRATORY (INHALATION) at 11:42

## 2018-01-07 RX ADMIN — SODIUM CHLORIDE PRN UNIT: 9 INJECTION, SOLUTION INTRAVENOUS at 11:59

## 2018-01-07 RX ADMIN — CARBIDOPA AND LEVODOPA SCH TAB.SA: 25; 100 TABLET, EXTENDED RELEASE ORAL at 21:00

## 2018-01-08 VITALS — SYSTOLIC BLOOD PRESSURE: 151 MMHG | DIASTOLIC BLOOD PRESSURE: 68 MMHG

## 2018-01-08 VITALS — DIASTOLIC BLOOD PRESSURE: 75 MMHG | SYSTOLIC BLOOD PRESSURE: 138 MMHG

## 2018-01-08 VITALS — DIASTOLIC BLOOD PRESSURE: 65 MMHG | SYSTOLIC BLOOD PRESSURE: 110 MMHG

## 2018-01-08 VITALS — SYSTOLIC BLOOD PRESSURE: 126 MMHG | DIASTOLIC BLOOD PRESSURE: 65 MMHG

## 2018-01-08 LAB
BASOPHILS # BLD AUTO: 0 K/UL (ref 0–8)
BASOPHILS NFR BLD AUTO: 0.3 % (ref 0–2)
BUN SERPL-MCNC: 19 MG/DL (ref 7–18)
CHLORIDE SERPL-SCNC: 113 MMOL/L (ref 98–107)
CO2 SERPL-SCNC: 26 MMOL/L (ref 21–32)
CREAT SERPL-MCNC: 1.2 MG/DL (ref 0.6–1.3)
EOSINOPHIL # BLD AUTO: 0 K/UL (ref 0–0.7)
EOSINOPHIL NFR BLD AUTO: 0.1 % (ref 0–7)
GLUCOSE SERPL-MCNC: 148 MG/DL (ref 74–106)
HCT VFR BLD AUTO: 26.2 % (ref 36.7–47.1)
HGB BLD-MCNC: 8.7 G/DL (ref 12.5–16.3)
LYMPHOCYTES # BLD AUTO: 0.4 K/UL (ref 20–40)
LYMPHOCYTES NFR BLD AUTO: 13.8 % (ref 20.5–51.5)
MAGNESIUM SERPL-MCNC: 1.8 MG/DL (ref 1.8–2.4)
MCH RBC QN AUTO: 30.7 UUG (ref 23.8–33.4)
MCHC RBC AUTO-ENTMCNC: 33 G/DL (ref 32.5–36.3)
MCV RBC AUTO: 92 FL (ref 73–96.2)
MONOCYTES # BLD AUTO: 0.3 K/UL (ref 2–10)
MONOCYTES NFR BLD AUTO: 8.5 % (ref 0–11)
NEUTROPHILS # BLD AUTO: 2.3 K/UL (ref 1.8–8.9)
NEUTROPHILS NFR BLD AUTO: 77.3 % (ref 38.5–71.5)
PHOSPHATE SERPL-MCNC: 4.1 MG/DL (ref 2.5–4.9)
PLATELET # BLD AUTO: 179 K/UL (ref 152–348)
POTASSIUM SERPL-SCNC: 3.4 MMOL/L (ref 3.5–5.1)
RBC # BLD AUTO: 2.84 MIL/UL (ref 4.06–5.63)
WBC # BLD AUTO: 3 K/UL (ref 3.6–10.2)

## 2018-01-08 RX ADMIN — DEXTROSE SCH MLS/HR: 50 INJECTION, SOLUTION INTRAVENOUS at 13:17

## 2018-01-08 RX ADMIN — ALBUTEROL SULFATE SCH MG: 2.5 SOLUTION RESPIRATORY (INHALATION) at 11:26

## 2018-01-08 RX ADMIN — ACETYLCYSTEINE SCH MG: 100 INHALANT RESPIRATORY (INHALATION) at 15:40

## 2018-01-08 RX ADMIN — TAZOBACTAM SODIUM AND PIPERACILLIN SODIUM SCH MLS/HR: 375; 3 INJECTION, SOLUTION INTRAVENOUS at 05:20

## 2018-01-08 RX ADMIN — BETHANECHOL CHLORIDE SCH MG: 25 TABLET ORAL at 08:43

## 2018-01-08 RX ADMIN — DEXTROSE SCH MLS/HR: 50 INJECTION, SOLUTION INTRAVENOUS at 16:08

## 2018-01-08 RX ADMIN — CARBIDOPA AND LEVODOPA SCH TAB: 25; 250 TABLET ORAL at 08:43

## 2018-01-08 RX ADMIN — BETHANECHOL CHLORIDE SCH MG: 25 TABLET ORAL at 17:00

## 2018-01-08 RX ADMIN — ALBUTEROL SULFATE PRN MG: 2.5 SOLUTION RESPIRATORY (INHALATION) at 23:20

## 2018-01-08 RX ADMIN — DEXTROSE SCH MLS/HR: 50 INJECTION, SOLUTION INTRAVENOUS at 08:44

## 2018-01-08 RX ADMIN — SODIUM CHLORIDE PRN MLS/HR: 0.9 INJECTION, SOLUTION INTRAVENOUS at 06:35

## 2018-01-08 RX ADMIN — DEXTROSE SCH MLS/HR: 50 INJECTION, SOLUTION INTRAVENOUS at 15:31

## 2018-01-08 RX ADMIN — PANTOPRAZOLE SODIUM SCH MG: 40 TABLET, DELAYED RELEASE ORAL at 07:00

## 2018-01-08 RX ADMIN — ASPIRIN SCH MG: 81 TABLET, COATED ORAL at 08:43

## 2018-01-08 RX ADMIN — TAZOBACTAM SODIUM AND PIPERACILLIN SODIUM SCH MLS/HR: 375; 3 INJECTION, SOLUTION INTRAVENOUS at 19:00

## 2018-01-08 RX ADMIN — METOPROLOL SUCCINATE SCH MG: 50 TABLET, FILM COATED, EXTENDED RELEASE ORAL at 10:39

## 2018-01-08 RX ADMIN — CARBIDOPA AND LEVODOPA SCH TAB: 25; 250 TABLET ORAL at 17:00

## 2018-01-08 RX ADMIN — CARBIDOPA AND LEVODOPA SCH TAB: 25; 250 TABLET ORAL at 13:00

## 2018-01-08 RX ADMIN — Medication SCH EACH: at 08:43

## 2018-01-08 RX ADMIN — TAZOBACTAM SODIUM AND PIPERACILLIN SODIUM SCH MLS/HR: 375; 3 INJECTION, SOLUTION INTRAVENOUS at 23:59

## 2018-01-08 RX ADMIN — ALBUTEROL SULFATE SCH MG: 2.5 SOLUTION RESPIRATORY (INHALATION) at 15:40

## 2018-01-08 RX ADMIN — Medication SCH EACH: at 18:00

## 2018-01-08 RX ADMIN — IPRATROPIUM BROMIDE SCH MG: 0.5 SOLUTION RESPIRATORY (INHALATION) at 15:40

## 2018-01-08 RX ADMIN — ACETYLCYSTEINE SCH MG: 100 INHALANT RESPIRATORY (INHALATION) at 07:52

## 2018-01-08 RX ADMIN — CARBIDOPA AND LEVODOPA SCH TAB: 25; 250 TABLET ORAL at 22:16

## 2018-01-08 RX ADMIN — IPRATROPIUM BROMIDE SCH MG: 0.5 SOLUTION RESPIRATORY (INHALATION) at 07:52

## 2018-01-08 RX ADMIN — ACETYLCYSTEINE SCH MG: 100 INHALANT RESPIRATORY (INHALATION) at 23:19

## 2018-01-08 RX ADMIN — Medication SCH EACH: at 05:19

## 2018-01-08 RX ADMIN — ALBUTEROL SULFATE SCH MG: 2.5 SOLUTION RESPIRATORY (INHALATION) at 07:52

## 2018-01-08 RX ADMIN — Medication SCH EACH: at 23:27

## 2018-01-08 RX ADMIN — Medication SCH EA: at 21:00

## 2018-01-08 RX ADMIN — SODIUM CHLORIDE PRN UNIT: 9 INJECTION, SOLUTION INTRAVENOUS at 23:33

## 2018-01-08 RX ADMIN — BETHANECHOL CHLORIDE SCH MG: 25 TABLET ORAL at 13:00

## 2018-01-08 RX ADMIN — Medication SCH EACH: at 12:00

## 2018-01-08 RX ADMIN — IPRATROPIUM BROMIDE SCH MG: 0.5 SOLUTION RESPIRATORY (INHALATION) at 11:26

## 2018-01-08 RX ADMIN — TAZOBACTAM SODIUM AND PIPERACILLIN SODIUM SCH MLS/HR: 375; 3 INJECTION, SOLUTION INTRAVENOUS at 11:56

## 2018-01-08 RX ADMIN — DEXTROSE SCH MLS/HR: 50 INJECTION, SOLUTION INTRAVENOUS at 01:00

## 2018-01-08 RX ADMIN — Medication SCH EACH: at 14:12

## 2018-01-08 RX ADMIN — ALBUTEROL SULFATE SCH MG: 2.5 SOLUTION RESPIRATORY (INHALATION) at 19:24

## 2018-01-08 RX ADMIN — CARBIDOPA AND LEVODOPA SCH TAB.SA: 25; 100 TABLET, EXTENDED RELEASE ORAL at 10:39

## 2018-01-08 RX ADMIN — IPRATROPIUM BROMIDE SCH MG: 0.5 SOLUTION RESPIRATORY (INHALATION) at 19:24

## 2018-01-08 RX ADMIN — DEXTROSE SCH MLS/HR: 50 INJECTION, SOLUTION INTRAVENOUS at 14:06

## 2018-01-08 RX ADMIN — Medication SCH EACH: at 22:15

## 2018-01-09 VITALS — SYSTOLIC BLOOD PRESSURE: 163 MMHG | DIASTOLIC BLOOD PRESSURE: 86 MMHG

## 2018-01-09 VITALS — SYSTOLIC BLOOD PRESSURE: 107 MMHG | DIASTOLIC BLOOD PRESSURE: 68 MMHG

## 2018-01-09 VITALS — DIASTOLIC BLOOD PRESSURE: 68 MMHG | SYSTOLIC BLOOD PRESSURE: 153 MMHG

## 2018-01-09 VITALS — DIASTOLIC BLOOD PRESSURE: 71 MMHG | SYSTOLIC BLOOD PRESSURE: 146 MMHG

## 2018-01-09 LAB
BASOPHILS # BLD AUTO: 0 K/UL (ref 0–8)
BASOPHILS NFR BLD AUTO: 0.3 % (ref 0–2)
BUN SERPL-MCNC: 16 MG/DL (ref 7–18)
CHLORIDE SERPL-SCNC: 112 MMOL/L (ref 98–107)
CO2 SERPL-SCNC: 25 MMOL/L (ref 21–32)
CREAT SERPL-MCNC: 1.1 MG/DL (ref 0.6–1.3)
EOSINOPHIL # BLD AUTO: 0 K/UL (ref 0–0.7)
EOSINOPHIL NFR BLD AUTO: 0.1 % (ref 0–7)
GLUCOSE SERPL-MCNC: 235 MG/DL (ref 74–106)
HCT VFR BLD AUTO: 25.6 % (ref 36.7–47.1)
HGB BLD-MCNC: 8.6 G/DL (ref 12.5–16.3)
LYMPHOCYTES # BLD AUTO: 0.5 K/UL (ref 20–40)
LYMPHOCYTES NFR BLD AUTO: 14.5 % (ref 20.5–51.5)
MAGNESIUM SERPL-MCNC: 1.8 MG/DL (ref 1.8–2.4)
MCH RBC QN AUTO: 30.4 UUG (ref 23.8–33.4)
MCHC RBC AUTO-ENTMCNC: 34 G/DL (ref 32.5–36.3)
MCV RBC AUTO: 90.6 FL (ref 73–96.2)
MONOCYTES # BLD AUTO: 0.3 K/UL (ref 2–10)
MONOCYTES NFR BLD AUTO: 9.9 % (ref 0–11)
NEUTROPHILS # BLD AUTO: 2.4 K/UL (ref 1.8–8.9)
NEUTROPHILS NFR BLD AUTO: 75.2 % (ref 38.5–71.5)
PHOSPHATE SERPL-MCNC: 3.3 MG/DL (ref 2.5–4.9)
PLATELET # BLD AUTO: 189 K/UL (ref 152–348)
POTASSIUM SERPL-SCNC: 3 MMOL/L (ref 3.5–5.1)
RBC # BLD AUTO: 2.82 MIL/UL (ref 4.06–5.63)
WBC # BLD AUTO: 3.2 K/UL (ref 3.6–10.2)

## 2018-01-09 RX ADMIN — ALBUTEROL SULFATE SCH MG: 2.5 SOLUTION RESPIRATORY (INHALATION) at 11:27

## 2018-01-09 RX ADMIN — CARBIDOPA AND LEVODOPA SCH TAB: 25; 250 TABLET ORAL at 13:23

## 2018-01-09 RX ADMIN — ACETYLCYSTEINE SCH MG: 100 INHALANT RESPIRATORY (INHALATION) at 07:26

## 2018-01-09 RX ADMIN — Medication SCH EACH: at 12:42

## 2018-01-09 RX ADMIN — ALBUTEROL SULFATE SCH MG: 2.5 SOLUTION RESPIRATORY (INHALATION) at 14:36

## 2018-01-09 RX ADMIN — ACETYLCYSTEINE SCH MG: 100 INHALANT RESPIRATORY (INHALATION) at 14:36

## 2018-01-09 RX ADMIN — ACETAMINOPHEN PRN MG: 325 TABLET ORAL at 21:52

## 2018-01-09 RX ADMIN — BETHANECHOL CHLORIDE SCH MG: 25 TABLET ORAL at 13:23

## 2018-01-09 RX ADMIN — IPRATROPIUM BROMIDE SCH MG: 0.5 SOLUTION RESPIRATORY (INHALATION) at 14:36

## 2018-01-09 RX ADMIN — TAZOBACTAM SODIUM AND PIPERACILLIN SODIUM SCH MLS/HR: 375; 3 INJECTION, SOLUTION INTRAVENOUS at 07:06

## 2018-01-09 RX ADMIN — Medication SCH EACH: at 18:44

## 2018-01-09 RX ADMIN — METOPROLOL SUCCINATE SCH MG: 50 TABLET, FILM COATED, EXTENDED RELEASE ORAL at 21:00

## 2018-01-09 RX ADMIN — ALBUTEROL SULFATE SCH MG: 2.5 SOLUTION RESPIRATORY (INHALATION) at 07:26

## 2018-01-09 RX ADMIN — IPRATROPIUM BROMIDE SCH MG: 0.5 SOLUTION RESPIRATORY (INHALATION) at 07:26

## 2018-01-09 RX ADMIN — TAZOBACTAM SODIUM AND PIPERACILLIN SODIUM SCH MLS/HR: 375; 3 INJECTION, SOLUTION INTRAVENOUS at 12:43

## 2018-01-09 RX ADMIN — CARBIDOPA AND LEVODOPA SCH TAB.SA: 25; 100 TABLET, EXTENDED RELEASE ORAL at 21:00

## 2018-01-09 RX ADMIN — PANTOPRAZOLE SODIUM SCH MG: 40 TABLET, DELAYED RELEASE ORAL at 07:00

## 2018-01-09 RX ADMIN — Medication SCH EACH: at 10:40

## 2018-01-09 RX ADMIN — BETHANECHOL CHLORIDE SCH MG: 25 TABLET ORAL at 10:40

## 2018-01-09 RX ADMIN — IPRATROPIUM BROMIDE SCH MG: 0.5 SOLUTION RESPIRATORY (INHALATION) at 11:27

## 2018-01-09 RX ADMIN — DEXTROSE SCH MLS/HR: 50 INJECTION, SOLUTION INTRAVENOUS at 04:05

## 2018-01-09 RX ADMIN — ASPIRIN SCH MG: 81 TABLET, COATED ORAL at 10:40

## 2018-01-09 RX ADMIN — ALBUTEROL SULFATE PRN MG: 2.5 SOLUTION RESPIRATORY (INHALATION) at 22:49

## 2018-01-09 RX ADMIN — CARBIDOPA AND LEVODOPA SCH TAB: 25; 250 TABLET ORAL at 10:40

## 2018-01-09 RX ADMIN — BETHANECHOL CHLORIDE SCH MG: 25 TABLET ORAL at 18:23

## 2018-01-09 RX ADMIN — ALBUTEROL SULFATE SCH MG: 2.5 SOLUTION RESPIRATORY (INHALATION) at 19:23

## 2018-01-09 RX ADMIN — CARBIDOPA AND LEVODOPA SCH TAB: 25; 250 TABLET ORAL at 21:00

## 2018-01-09 RX ADMIN — SODIUM CHLORIDE PRN UNIT: 9 INJECTION, SOLUTION INTRAVENOUS at 06:44

## 2018-01-09 RX ADMIN — CARBIDOPA AND LEVODOPA SCH TAB: 25; 250 TABLET ORAL at 21:53

## 2018-01-09 RX ADMIN — ACETYLCYSTEINE SCH MG: 100 INHALANT RESPIRATORY (INHALATION) at 22:49

## 2018-01-09 RX ADMIN — Medication SCH EA: at 21:55

## 2018-01-09 RX ADMIN — Medication SCH EACH: at 21:52

## 2018-01-09 RX ADMIN — IPRATROPIUM BROMIDE SCH MG: 0.5 SOLUTION RESPIRATORY (INHALATION) at 19:23

## 2018-01-09 RX ADMIN — DEXTROSE SCH MLS/HR: 50 INJECTION, SOLUTION INTRAVENOUS at 23:48

## 2018-01-09 RX ADMIN — Medication SCH EACH: at 23:50

## 2018-01-09 RX ADMIN — CARBIDOPA AND LEVODOPA SCH TAB: 25; 250 TABLET ORAL at 18:23

## 2018-01-09 RX ADMIN — Medication SCH EA: at 21:00

## 2018-01-09 RX ADMIN — Medication SCH EACH: at 21:00

## 2018-01-09 RX ADMIN — TAZOBACTAM SODIUM AND PIPERACILLIN SODIUM SCH MLS/HR: 375; 3 INJECTION, SOLUTION INTRAVENOUS at 18:27

## 2018-01-09 RX ADMIN — IPRATROPIUM BROMIDE PRN MG: 0.5 SOLUTION RESPIRATORY (INHALATION) at 22:49

## 2018-01-10 VITALS — DIASTOLIC BLOOD PRESSURE: 66 MMHG | SYSTOLIC BLOOD PRESSURE: 126 MMHG

## 2018-01-10 VITALS — SYSTOLIC BLOOD PRESSURE: 118 MMHG | DIASTOLIC BLOOD PRESSURE: 67 MMHG

## 2018-01-10 VITALS — SYSTOLIC BLOOD PRESSURE: 104 MMHG | DIASTOLIC BLOOD PRESSURE: 66 MMHG

## 2018-01-10 LAB
ALP SERPL-CCNC: 41 U/L (ref 50–136)
ALT SERPL W/O P-5'-P-CCNC: 12 U/L (ref 16–63)
AST SERPL-CCNC: 21 U/L (ref 15–37)
BASOPHILS # BLD AUTO: 0 K/UL (ref 0–8)
BASOPHILS NFR BLD AUTO: 0.3 % (ref 0–2)
BILIRUB SERPL-MCNC: 0.4 MG/DL (ref 0.2–1)
BUN SERPL-MCNC: 15 MG/DL (ref 7–18)
CHLORIDE SERPL-SCNC: 112 MMOL/L (ref 98–107)
CO2 SERPL-SCNC: 26 MMOL/L (ref 21–32)
CREAT SERPL-MCNC: 1.2 MG/DL (ref 0.6–1.3)
EOSINOPHIL # BLD AUTO: 0 K/UL (ref 0–0.7)
EOSINOPHIL NFR BLD AUTO: 0.2 % (ref 0–7)
GLUCOSE SERPL-MCNC: 186 MG/DL (ref 74–106)
HCT VFR BLD AUTO: 27 % (ref 36.7–47.1)
HGB BLD-MCNC: 9 G/DL (ref 12.5–16.3)
LYMPHOCYTES # BLD AUTO: 0.4 K/UL (ref 20–40)
LYMPHOCYTES NFR BLD AUTO: 11.8 % (ref 20.5–51.5)
MAGNESIUM SERPL-MCNC: 1.9 MG/DL (ref 1.8–2.4)
MCH RBC QN AUTO: 30.3 UUG (ref 23.8–33.4)
MCHC RBC AUTO-ENTMCNC: 33 G/DL (ref 32.5–36.3)
MCV RBC AUTO: 91 FL (ref 73–96.2)
MONOCYTES # BLD AUTO: 0.3 K/UL (ref 2–10)
MONOCYTES NFR BLD AUTO: 7.8 % (ref 0–11)
NEUTROPHILS # BLD AUTO: 2.9 K/UL (ref 1.8–8.9)
NEUTROPHILS NFR BLD AUTO: 79.9 % (ref 38.5–71.5)
PHOSPHATE SERPL-MCNC: 3.5 MG/DL (ref 2.5–4.9)
PLATELET # BLD AUTO: 229 K/UL (ref 152–348)
POTASSIUM SERPL-SCNC: 3.2 MMOL/L (ref 3.5–5.1)
RBC # BLD AUTO: 2.97 MIL/UL (ref 4.06–5.63)
WBC # BLD AUTO: 3.6 K/UL (ref 3.6–10.2)
WS STN SPEC: 6.7 G/DL (ref 6.4–8.2)

## 2018-01-10 RX ADMIN — Medication SCH EACH: at 11:20

## 2018-01-10 RX ADMIN — Medication SCH EA: at 21:00

## 2018-01-10 RX ADMIN — BETHANECHOL CHLORIDE SCH MG: 25 TABLET ORAL at 08:28

## 2018-01-10 RX ADMIN — IPRATROPIUM BROMIDE SCH MG: 0.5 SOLUTION RESPIRATORY (INHALATION) at 08:24

## 2018-01-10 RX ADMIN — IPRATROPIUM BROMIDE SCH MG: 0.5 SOLUTION RESPIRATORY (INHALATION) at 15:33

## 2018-01-10 RX ADMIN — CARBIDOPA AND LEVODOPA SCH TAB: 25; 250 TABLET ORAL at 13:00

## 2018-01-10 RX ADMIN — BETHANECHOL CHLORIDE SCH MG: 25 TABLET ORAL at 13:00

## 2018-01-10 RX ADMIN — IPRATROPIUM BROMIDE SCH MG: 0.5 SOLUTION RESPIRATORY (INHALATION) at 11:52

## 2018-01-10 RX ADMIN — ALBUTEROL SULFATE SCH MG: 2.5 SOLUTION RESPIRATORY (INHALATION) at 19:48

## 2018-01-10 RX ADMIN — CARBIDOPA AND LEVODOPA SCH TAB: 25; 250 TABLET ORAL at 08:28

## 2018-01-10 RX ADMIN — Medication SCH EACH: at 08:28

## 2018-01-10 RX ADMIN — ASPIRIN SCH MG: 81 TABLET, COATED ORAL at 08:28

## 2018-01-10 RX ADMIN — TAZOBACTAM SODIUM AND PIPERACILLIN SODIUM SCH MLS/HR: 375; 3 INJECTION, SOLUTION INTRAVENOUS at 17:03

## 2018-01-10 RX ADMIN — DEXTROSE SCH MLS/HR: 50 INJECTION, SOLUTION INTRAVENOUS at 13:24

## 2018-01-10 RX ADMIN — ALBUTEROL SULFATE SCH MG: 2.5 SOLUTION RESPIRATORY (INHALATION) at 15:33

## 2018-01-10 RX ADMIN — METOPROLOL SUCCINATE SCH MG: 50 TABLET, FILM COATED, EXTENDED RELEASE ORAL at 21:00

## 2018-01-10 RX ADMIN — ALBUTEROL SULFATE PRN MG: 2.5 SOLUTION RESPIRATORY (INHALATION) at 23:38

## 2018-01-10 RX ADMIN — TAZOBACTAM SODIUM AND PIPERACILLIN SODIUM SCH MLS/HR: 375; 3 INJECTION, SOLUTION INTRAVENOUS at 00:55

## 2018-01-10 RX ADMIN — ALBUTEROL SULFATE SCH MG: 2.5 SOLUTION RESPIRATORY (INHALATION) at 08:24

## 2018-01-10 RX ADMIN — IPRATROPIUM BROMIDE SCH MG: 0.5 SOLUTION RESPIRATORY (INHALATION) at 19:47

## 2018-01-10 RX ADMIN — Medication SCH EACH: at 06:00

## 2018-01-10 RX ADMIN — DEXTROSE SCH MLS/HR: 50 INJECTION, SOLUTION INTRAVENOUS at 19:26

## 2018-01-10 RX ADMIN — CARBIDOPA AND LEVODOPA SCH TAB: 25; 250 TABLET ORAL at 16:07

## 2018-01-10 RX ADMIN — PANTOPRAZOLE SODIUM SCH MG: 40 TABLET, DELAYED RELEASE ORAL at 07:00

## 2018-01-10 RX ADMIN — DEXTROSE SCH MLS/HR: 50 INJECTION, SOLUTION INTRAVENOUS at 14:40

## 2018-01-10 RX ADMIN — BETHANECHOL CHLORIDE SCH MG: 25 TABLET ORAL at 16:07

## 2018-01-10 RX ADMIN — IPRATROPIUM BROMIDE PRN MG: 0.5 SOLUTION RESPIRATORY (INHALATION) at 23:38

## 2018-01-10 RX ADMIN — TAZOBACTAM SODIUM AND PIPERACILLIN SODIUM SCH MLS/HR: 375; 3 INJECTION, SOLUTION INTRAVENOUS at 11:20

## 2018-01-10 RX ADMIN — ACETYLCYSTEINE SCH MG: 100 INHALANT RESPIRATORY (INHALATION) at 23:38

## 2018-01-10 RX ADMIN — CARBIDOPA AND LEVODOPA SCH TAB: 25; 250 TABLET ORAL at 21:00

## 2018-01-10 RX ADMIN — ALBUTEROL SULFATE SCH MG: 2.5 SOLUTION RESPIRATORY (INHALATION) at 11:52

## 2018-01-10 RX ADMIN — Medication SCH EACH: at 17:01

## 2018-01-10 RX ADMIN — CARBIDOPA AND LEVODOPA SCH TAB.SA: 25; 100 TABLET, EXTENDED RELEASE ORAL at 21:00

## 2018-01-10 RX ADMIN — TAZOBACTAM SODIUM AND PIPERACILLIN SODIUM SCH MLS/HR: 375; 3 INJECTION, SOLUTION INTRAVENOUS at 05:27

## 2018-01-10 RX ADMIN — Medication SCH EACH: at 21:00

## 2018-01-10 RX ADMIN — ACETYLCYSTEINE SCH MG: 100 INHALANT RESPIRATORY (INHALATION) at 08:24

## 2018-01-10 RX ADMIN — ACETYLCYSTEINE SCH MG: 100 INHALANT RESPIRATORY (INHALATION) at 15:30

## 2018-01-11 VITALS — DIASTOLIC BLOOD PRESSURE: 59 MMHG | SYSTOLIC BLOOD PRESSURE: 154 MMHG

## 2018-01-11 VITALS — DIASTOLIC BLOOD PRESSURE: 63 MMHG | SYSTOLIC BLOOD PRESSURE: 153 MMHG

## 2018-01-11 VITALS — SYSTOLIC BLOOD PRESSURE: 153 MMHG | DIASTOLIC BLOOD PRESSURE: 68 MMHG

## 2018-01-11 VITALS — SYSTOLIC BLOOD PRESSURE: 129 MMHG | DIASTOLIC BLOOD PRESSURE: 66 MMHG

## 2018-01-11 LAB
BASOPHILS # BLD AUTO: 0 K/UL (ref 0–8)
BASOPHILS NFR BLD AUTO: 0.4 % (ref 0–2)
BUN SERPL-MCNC: 16 MG/DL (ref 7–18)
CHLORIDE SERPL-SCNC: 115 MMOL/L (ref 98–107)
CO2 SERPL-SCNC: 26 MMOL/L (ref 21–32)
CREAT SERPL-MCNC: 1.2 MG/DL (ref 0.6–1.3)
EOSINOPHIL # BLD AUTO: 0 K/UL (ref 0–0.7)
EOSINOPHIL NFR BLD AUTO: 0.6 % (ref 0–7)
GLUCOSE SERPL-MCNC: 204 MG/DL (ref 74–106)
HCT VFR BLD AUTO: 25.8 % (ref 36.7–47.1)
HGB BLD-MCNC: 8.7 G/DL (ref 12.5–16.3)
LYMPHOCYTES # BLD AUTO: 0.4 K/UL (ref 20–40)
LYMPHOCYTES NFR BLD AUTO: 10.8 % (ref 20.5–51.5)
MAGNESIUM SERPL-MCNC: 1.9 MG/DL (ref 1.8–2.4)
MCH RBC QN AUTO: 30.6 UUG (ref 23.8–33.4)
MCHC RBC AUTO-ENTMCNC: 34 G/DL (ref 32.5–36.3)
MCV RBC AUTO: 90.8 FL (ref 73–96.2)
MONOCYTES # BLD AUTO: 0.3 K/UL (ref 2–10)
MONOCYTES NFR BLD AUTO: 7.6 % (ref 0–11)
NEUTROPHILS # BLD AUTO: 3.2 K/UL (ref 1.8–8.9)
NEUTROPHILS NFR BLD AUTO: 80.6 % (ref 38.5–71.5)
PHOSPHATE SERPL-MCNC: 2.8 MG/DL (ref 2.5–4.9)
PLATELET # BLD AUTO: 264 K/UL (ref 152–348)
POTASSIUM SERPL-SCNC: 2.9 MMOL/L (ref 3.5–5.1)
RBC # BLD AUTO: 2.85 MIL/UL (ref 4.06–5.63)
WBC # BLD AUTO: 3.9 K/UL (ref 3.6–10.2)

## 2018-01-11 RX ADMIN — PANTOPRAZOLE SODIUM SCH MG: 40 TABLET, DELAYED RELEASE ORAL at 06:56

## 2018-01-11 RX ADMIN — ALBUTEROL SULFATE SCH MG: 2.5 SOLUTION RESPIRATORY (INHALATION) at 07:46

## 2018-01-11 RX ADMIN — SODIUM CHLORIDE PRN UNIT: 9 INJECTION, SOLUTION INTRAVENOUS at 17:08

## 2018-01-11 RX ADMIN — SODIUM CHLORIDE PRN UNIT: 9 INJECTION, SOLUTION INTRAVENOUS at 11:18

## 2018-01-11 RX ADMIN — ALBUTEROL SULFATE SCH MG: 2.5 SOLUTION RESPIRATORY (INHALATION) at 11:55

## 2018-01-11 RX ADMIN — TAZOBACTAM SODIUM AND PIPERACILLIN SODIUM SCH MLS/HR: 375; 3 INJECTION, SOLUTION INTRAVENOUS at 06:36

## 2018-01-11 RX ADMIN — ASPIRIN SCH MG: 81 TABLET, COATED ORAL at 08:24

## 2018-01-11 RX ADMIN — IPRATROPIUM BROMIDE SCH MG: 0.5 SOLUTION RESPIRATORY (INHALATION) at 15:36

## 2018-01-11 RX ADMIN — IPRATROPIUM BROMIDE SCH MG: 0.5 SOLUTION RESPIRATORY (INHALATION) at 07:46

## 2018-01-11 RX ADMIN — ACETYLCYSTEINE SCH MG: 100 INHALANT RESPIRATORY (INHALATION) at 15:36

## 2018-01-11 RX ADMIN — IPRATROPIUM BROMIDE SCH MG: 0.5 SOLUTION RESPIRATORY (INHALATION) at 20:49

## 2018-01-11 RX ADMIN — BETHANECHOL CHLORIDE SCH MG: 25 TABLET ORAL at 16:00

## 2018-01-11 RX ADMIN — ALBUTEROL SULFATE SCH MG: 2.5 SOLUTION RESPIRATORY (INHALATION) at 20:50

## 2018-01-11 RX ADMIN — Medication SCH EACH: at 08:23

## 2018-01-11 RX ADMIN — BETHANECHOL CHLORIDE SCH MG: 25 TABLET ORAL at 12:01

## 2018-01-11 RX ADMIN — TAZOBACTAM SODIUM AND PIPERACILLIN SODIUM SCH MLS/HR: 375; 3 INJECTION, SOLUTION INTRAVENOUS at 01:01

## 2018-01-11 RX ADMIN — CARBIDOPA AND LEVODOPA SCH TAB: 25; 250 TABLET ORAL at 08:23

## 2018-01-11 RX ADMIN — Medication SCH EA: at 21:00

## 2018-01-11 RX ADMIN — Medication SCH EACH: at 02:28

## 2018-01-11 RX ADMIN — IPRATROPIUM BROMIDE SCH MG: 0.5 SOLUTION RESPIRATORY (INHALATION) at 11:55

## 2018-01-11 RX ADMIN — SODIUM CHLORIDE PRN UNIT: 9 INJECTION, SOLUTION INTRAVENOUS at 02:30

## 2018-01-11 RX ADMIN — CARBIDOPA AND LEVODOPA SCH TAB: 25; 250 TABLET ORAL at 12:01

## 2018-01-11 RX ADMIN — TAZOBACTAM SODIUM AND PIPERACILLIN SODIUM SCH MLS/HR: 375; 3 INJECTION, SOLUTION INTRAVENOUS at 17:19

## 2018-01-11 RX ADMIN — Medication SCH EACH: at 17:05

## 2018-01-11 RX ADMIN — CARBIDOPA AND LEVODOPA SCH TAB: 25; 250 TABLET ORAL at 21:00

## 2018-01-11 RX ADMIN — ACETYLCYSTEINE SCH MG: 100 INHALANT RESPIRATORY (INHALATION) at 20:51

## 2018-01-11 RX ADMIN — SODIUM CHLORIDE PRN UNIT: 9 INJECTION, SOLUTION INTRAVENOUS at 08:56

## 2018-01-11 RX ADMIN — Medication SCH EACH: at 23:50

## 2018-01-11 RX ADMIN — Medication SCH EACH: at 06:56

## 2018-01-11 RX ADMIN — ACETYLCYSTEINE SCH MG: 100 INHALANT RESPIRATORY (INHALATION) at 07:46

## 2018-01-11 RX ADMIN — ALBUTEROL SULFATE SCH MG: 2.5 SOLUTION RESPIRATORY (INHALATION) at 15:36

## 2018-01-11 RX ADMIN — CARBIDOPA AND LEVODOPA SCH TAB: 25; 250 TABLET ORAL at 16:00

## 2018-01-11 RX ADMIN — METOPROLOL SUCCINATE SCH MG: 50 TABLET, FILM COATED, EXTENDED RELEASE ORAL at 21:00

## 2018-01-11 RX ADMIN — BETHANECHOL CHLORIDE SCH MG: 25 TABLET ORAL at 08:23

## 2018-01-11 RX ADMIN — ACETAMINOPHEN PRN MG: 650 SUPPOSITORY RECTAL at 08:53

## 2018-01-11 RX ADMIN — Medication SCH EACH: at 11:17

## 2018-01-11 RX ADMIN — Medication SCH EACH: at 21:00

## 2018-01-11 RX ADMIN — DEXTROSE SCH MLS/HR: 50 INJECTION, SOLUTION INTRAVENOUS at 12:58

## 2018-01-11 RX ADMIN — CARBIDOPA AND LEVODOPA SCH TAB.SA: 25; 100 TABLET, EXTENDED RELEASE ORAL at 21:00

## 2018-01-11 RX ADMIN — TAZOBACTAM SODIUM AND PIPERACILLIN SODIUM SCH MLS/HR: 375; 3 INJECTION, SOLUTION INTRAVENOUS at 11:13

## 2018-01-12 VITALS — DIASTOLIC BLOOD PRESSURE: 78 MMHG | SYSTOLIC BLOOD PRESSURE: 147 MMHG

## 2018-01-12 VITALS — SYSTOLIC BLOOD PRESSURE: 162 MMHG | DIASTOLIC BLOOD PRESSURE: 77 MMHG

## 2018-01-12 VITALS — SYSTOLIC BLOOD PRESSURE: 162 MMHG | DIASTOLIC BLOOD PRESSURE: 73 MMHG

## 2018-01-12 VITALS — SYSTOLIC BLOOD PRESSURE: 161 MMHG | DIASTOLIC BLOOD PRESSURE: 70 MMHG

## 2018-01-12 VITALS — SYSTOLIC BLOOD PRESSURE: 137 MMHG | DIASTOLIC BLOOD PRESSURE: 70 MMHG

## 2018-01-12 VITALS — SYSTOLIC BLOOD PRESSURE: 149 MMHG | DIASTOLIC BLOOD PRESSURE: 75 MMHG

## 2018-01-12 LAB
ALP SERPL-CCNC: 40 U/L (ref 50–136)
ALT SERPL W/O P-5'-P-CCNC: 22 U/L (ref 16–63)
AST SERPL-CCNC: 16 U/L (ref 15–37)
BASOPHILS # BLD AUTO: 0 K/UL (ref 0–8)
BASOPHILS NFR BLD AUTO: 0.3 % (ref 0–2)
BILIRUB SERPL-MCNC: 0.3 MG/DL (ref 0.2–1)
BUN SERPL-MCNC: 13 MG/DL (ref 7–18)
CHLORIDE SERPL-SCNC: 117 MMOL/L (ref 98–107)
CO2 SERPL-SCNC: 27 MMOL/L (ref 21–32)
CREAT SERPL-MCNC: 1.1 MG/DL (ref 0.6–1.3)
EOSINOPHIL # BLD AUTO: 0.1 K/UL (ref 0–0.7)
EOSINOPHIL NFR BLD AUTO: 1.3 % (ref 0–7)
GLUCOSE SERPL-MCNC: 180 MG/DL (ref 74–106)
HCT VFR BLD AUTO: 26.9 % (ref 36.7–47.1)
HGB BLD-MCNC: 8.9 G/DL (ref 12.5–16.3)
LYMPHOCYTES # BLD AUTO: 0.5 K/UL (ref 20–40)
LYMPHOCYTES NFR BLD AUTO: 11.1 % (ref 20.5–51.5)
MAGNESIUM SERPL-MCNC: 2 MG/DL (ref 1.8–2.4)
MCH RBC QN AUTO: 30.1 UUG (ref 23.8–33.4)
MCHC RBC AUTO-ENTMCNC: 33 G/DL (ref 32.5–36.3)
MCV RBC AUTO: 91 FL (ref 73–96.2)
MONOCYTES # BLD AUTO: 0.3 K/UL (ref 2–10)
MONOCYTES NFR BLD AUTO: 7.1 % (ref 0–11)
NEUTROPHILS # BLD AUTO: 4 K/UL (ref 1.8–8.9)
NEUTROPHILS NFR BLD AUTO: 80.2 % (ref 38.5–71.5)
PHOSPHATE SERPL-MCNC: 3.1 MG/DL (ref 2.5–4.9)
PLATELET # BLD AUTO: 295 K/UL (ref 152–348)
POTASSIUM SERPL-SCNC: 2.9 MMOL/L (ref 3.5–5.1)
RBC # BLD AUTO: 2.95 MIL/UL (ref 4.06–5.63)
WBC # BLD AUTO: 4.9 K/UL (ref 3.6–10.2)
WS STN SPEC: 6.5 G/DL (ref 6.4–8.2)

## 2018-01-12 PROCEDURE — 0DH63UZ INSERTION OF FEEDING DEVICE INTO STOMACH, PERCUTANEOUS APPROACH: ICD-10-PCS | Performed by: SURGERY

## 2018-01-12 PROCEDURE — 3E0G76Z INTRODUCTION OF NUTRITIONAL SUBSTANCE INTO UPPER GI, VIA NATURAL OR ARTIFICIAL OPENING: ICD-10-PCS | Performed by: SURGERY

## 2018-01-12 RX ADMIN — CARBIDOPA AND LEVODOPA SCH TAB.SA: 25; 100 TABLET, EXTENDED RELEASE ORAL at 21:00

## 2018-01-12 RX ADMIN — Medication SCH EACH: at 11:00

## 2018-01-12 RX ADMIN — IPRATROPIUM BROMIDE SCH MG: 0.5 SOLUTION RESPIRATORY (INHALATION) at 22:30

## 2018-01-12 RX ADMIN — Medication SCH EACH: at 15:57

## 2018-01-12 RX ADMIN — TAZOBACTAM SODIUM AND PIPERACILLIN SODIUM SCH MLS/HR: 375; 3 INJECTION, SOLUTION INTRAVENOUS at 11:02

## 2018-01-12 RX ADMIN — SODIUM CHLORIDE PRN UNIT: 9 INJECTION, SOLUTION INTRAVENOUS at 17:29

## 2018-01-12 RX ADMIN — TAZOBACTAM SODIUM AND PIPERACILLIN SODIUM SCH MLS/HR: 375; 3 INJECTION, SOLUTION INTRAVENOUS at 17:28

## 2018-01-12 RX ADMIN — TAZOBACTAM SODIUM AND PIPERACILLIN SODIUM SCH MLS/HR: 375; 3 INJECTION, SOLUTION INTRAVENOUS at 06:02

## 2018-01-12 RX ADMIN — Medication SCH EACH: at 08:19

## 2018-01-12 RX ADMIN — IPRATROPIUM BROMIDE SCH MG: 0.5 SOLUTION RESPIRATORY (INHALATION) at 16:16

## 2018-01-12 RX ADMIN — CARBIDOPA AND LEVODOPA SCH TAB: 25; 250 TABLET ORAL at 22:21

## 2018-01-12 RX ADMIN — DEXTROSE SCH MLS/HR: 50 INJECTION, SOLUTION INTRAVENOUS at 14:01

## 2018-01-12 RX ADMIN — Medication SCH EACH: at 17:26

## 2018-01-12 RX ADMIN — SODIUM CHLORIDE PRN UNIT: 9 INJECTION, SOLUTION INTRAVENOUS at 07:37

## 2018-01-12 RX ADMIN — ALBUTEROL SULFATE SCH MG: 2.5 SOLUTION RESPIRATORY (INHALATION) at 22:30

## 2018-01-12 RX ADMIN — DEXTROSE SCH MLS/HR: 50 INJECTION, SOLUTION INTRAVENOUS at 13:32

## 2018-01-12 RX ADMIN — Medication PRN ML: at 16:14

## 2018-01-12 RX ADMIN — CARBIDOPA AND LEVODOPA SCH TAB: 25; 250 TABLET ORAL at 13:00

## 2018-01-12 RX ADMIN — ACETAMINOPHEN PRN MG: 325 TABLET ORAL at 15:57

## 2018-01-12 RX ADMIN — IPRATROPIUM BROMIDE SCH MG: 0.5 SOLUTION RESPIRATORY (INHALATION) at 07:28

## 2018-01-12 RX ADMIN — ACETYLCYSTEINE SCH MG: 100 INHALANT RESPIRATORY (INHALATION) at 07:28

## 2018-01-12 RX ADMIN — CARBIDOPA AND LEVODOPA SCH TAB: 25; 250 TABLET ORAL at 15:57

## 2018-01-12 RX ADMIN — ASPIRIN SCH MG: 81 TABLET, COATED ORAL at 15:57

## 2018-01-12 RX ADMIN — ACETAMINOPHEN PRN MG: 650 SUPPOSITORY RECTAL at 07:36

## 2018-01-12 RX ADMIN — ENALAPRIL MALEATE SCH MG: 5 TABLET ORAL at 18:24

## 2018-01-12 RX ADMIN — ACETYLCYSTEINE SCH MG: 100 INHALANT RESPIRATORY (INHALATION) at 16:16

## 2018-01-12 RX ADMIN — ALBUTEROL SULFATE SCH MG: 2.5 SOLUTION RESPIRATORY (INHALATION) at 07:28

## 2018-01-12 RX ADMIN — Medication SCH EACH: at 06:11

## 2018-01-12 RX ADMIN — TAZOBACTAM SODIUM AND PIPERACILLIN SODIUM SCH MLS/HR: 375; 3 INJECTION, SOLUTION INTRAVENOUS at 00:07

## 2018-01-12 RX ADMIN — Medication SCH EACH: at 22:20

## 2018-01-12 RX ADMIN — SODIUM CHLORIDE PRN UNIT: 9 INJECTION, SOLUTION INTRAVENOUS at 00:03

## 2018-01-12 RX ADMIN — IPRATROPIUM BROMIDE SCH MG: 0.5 SOLUTION RESPIRATORY (INHALATION) at 11:30

## 2018-01-12 RX ADMIN — PANTOPRAZOLE SODIUM SCH MG: 40 TABLET, DELAYED RELEASE ORAL at 07:00

## 2018-01-12 RX ADMIN — METOPROLOL SUCCINATE SCH MG: 50 TABLET, FILM COATED, EXTENDED RELEASE ORAL at 22:25

## 2018-01-12 RX ADMIN — ALBUTEROL SULFATE SCH MG: 2.5 SOLUTION RESPIRATORY (INHALATION) at 16:16

## 2018-01-12 RX ADMIN — BETHANECHOL CHLORIDE SCH MG: 25 TABLET ORAL at 08:19

## 2018-01-12 RX ADMIN — ASPIRIN SCH MG: 81 TABLET, COATED ORAL at 08:19

## 2018-01-12 RX ADMIN — Medication SCH EA: at 22:27

## 2018-01-12 RX ADMIN — BETHANECHOL CHLORIDE SCH MG: 25 TABLET ORAL at 13:00

## 2018-01-12 RX ADMIN — ALBUTEROL SULFATE SCH MG: 2.5 SOLUTION RESPIRATORY (INHALATION) at 11:30

## 2018-01-12 RX ADMIN — BETHANECHOL CHLORIDE SCH MG: 25 TABLET ORAL at 15:57

## 2018-01-12 RX ADMIN — CARBIDOPA AND LEVODOPA SCH TAB: 25; 250 TABLET ORAL at 08:19

## 2018-01-12 RX ADMIN — ACETYLCYSTEINE SCH MG: 100 INHALANT RESPIRATORY (INHALATION) at 22:30

## 2018-01-13 VITALS — SYSTOLIC BLOOD PRESSURE: 158 MMHG | DIASTOLIC BLOOD PRESSURE: 76 MMHG

## 2018-01-13 VITALS — DIASTOLIC BLOOD PRESSURE: 69 MMHG | SYSTOLIC BLOOD PRESSURE: 145 MMHG

## 2018-01-13 VITALS — SYSTOLIC BLOOD PRESSURE: 145 MMHG | DIASTOLIC BLOOD PRESSURE: 78 MMHG

## 2018-01-13 VITALS — DIASTOLIC BLOOD PRESSURE: 78 MMHG | SYSTOLIC BLOOD PRESSURE: 146 MMHG

## 2018-01-13 LAB
ALP SERPL-CCNC: 45 U/L (ref 50–136)
ALT SERPL W/O P-5'-P-CCNC: 10 U/L (ref 16–63)
APPEARANCE UR: (no result)
AST SERPL-CCNC: 15 U/L (ref 15–37)
BASOPHILS # BLD AUTO: 0 K/UL (ref 0–8)
BASOPHILS NFR BLD AUTO: 0.3 % (ref 0–2)
BILIRUB SERPL-MCNC: 0.4 MG/DL (ref 0.2–1)
BILIRUB UR QL STRIP: (no result)
BUN SERPL-MCNC: 18 MG/DL (ref 7–18)
CHLORIDE SERPL-SCNC: 117 MMOL/L (ref 98–107)
CO2 SERPL-SCNC: 28 MMOL/L (ref 21–32)
CREAT SERPL-MCNC: 1.2 MG/DL (ref 0.6–1.3)
DEPRECATED SQUAMOUS URNS QL MICRO: (no result) /HPF
EOSINOPHIL # BLD AUTO: 0 K/UL (ref 0–0.7)
EOSINOPHIL NFR BLD AUTO: 0.6 % (ref 0–7)
GLUCOSE SERPL-MCNC: 211 MG/DL (ref 74–106)
GLUCOSE UR STRIP-MCNC: NEGATIVE MG/DL
HCT VFR BLD AUTO: 28.4 % (ref 36.7–47.1)
HGB BLD-MCNC: 9.5 G/DL (ref 12.5–16.3)
HGB UR QL STRIP: (no result)
KETONES UR STRIP-MCNC: (no result) MG/DL
LEUKOCYTE ESTERASE UR QL STRIP: (no result)
LYMPHOCYTES # BLD AUTO: 0.5 K/UL (ref 20–40)
LYMPHOCYTES NFR BLD AUTO: 6.7 % (ref 20.5–51.5)
MAGNESIUM SERPL-MCNC: 2 MG/DL (ref 1.8–2.4)
MCH RBC QN AUTO: 30.3 UUG (ref 23.8–33.4)
MCHC RBC AUTO-ENTMCNC: 33 G/DL (ref 32.5–36.3)
MCV RBC AUTO: 90.8 FL (ref 73–96.2)
MONOCYTES # BLD AUTO: 0.4 K/UL (ref 2–10)
MONOCYTES NFR BLD AUTO: 6.1 % (ref 0–11)
NEUTROPHILS # BLD AUTO: 5.8 K/UL (ref 1.8–8.9)
NEUTROPHILS NFR BLD AUTO: 86.3 % (ref 38.5–71.5)
NITRITE UR QL STRIP: NEGATIVE
PH UR STRIP: 5.5 [PH] (ref 5–8)
PHOSPHATE SERPL-MCNC: 3.3 MG/DL (ref 2.5–4.9)
PLATELET # BLD AUTO: 322 K/UL (ref 152–348)
POTASSIUM SERPL-SCNC: 2.9 MMOL/L (ref 3.5–5.1)
PROT UR QL STRIP: (no result)
RBC # BLD AUTO: 3.13 MIL/UL (ref 4.06–5.63)
RBC #/AREA URNS HPF: (no result) /HPF (ref 0–3)
SP GR UR STRIP: 1.02 (ref 1–1.03)
URATE CRY URNS QL MICRO: (no result) /HPF
UROBILINOGEN UR STRIP-MCNC: 0.2 E.U./DL
WBC # BLD AUTO: 6.8 K/UL (ref 3.6–10.2)
WBC #/AREA URNS HPF: (no result) /HPF
WBC #/AREA URNS HPF: (no result) /HPF (ref 0–3)
WS STN SPEC: 6.9 G/DL (ref 6.4–8.2)
YEAST URNS QL MICRO: (no result) /HPF

## 2018-01-13 RX ADMIN — Medication SCH EACH: at 05:30

## 2018-01-13 RX ADMIN — TAZOBACTAM SODIUM AND PIPERACILLIN SODIUM SCH MLS/HR: 375; 3 INJECTION, SOLUTION INTRAVENOUS at 11:12

## 2018-01-13 RX ADMIN — Medication SCH EACH: at 22:11

## 2018-01-13 RX ADMIN — IPRATROPIUM BROMIDE SCH MG: 0.5 SOLUTION RESPIRATORY (INHALATION) at 07:46

## 2018-01-13 RX ADMIN — FLUCONAZOLE SCH MG: 100 TABLET ORAL at 12:02

## 2018-01-13 RX ADMIN — ENALAPRIL MALEATE SCH MG: 5 TABLET ORAL at 05:31

## 2018-01-13 RX ADMIN — IPRATROPIUM BROMIDE SCH MG: 0.5 SOLUTION RESPIRATORY (INHALATION) at 11:28

## 2018-01-13 RX ADMIN — SODIUM CHLORIDE PRN UNIT: 9 INJECTION, SOLUTION INTRAVENOUS at 06:34

## 2018-01-13 RX ADMIN — ALBUTEROL SULFATE SCH MG: 2.5 SOLUTION RESPIRATORY (INHALATION) at 14:37

## 2018-01-13 RX ADMIN — CARBIDOPA AND LEVODOPA SCH TAB: 25; 250 TABLET ORAL at 22:16

## 2018-01-13 RX ADMIN — ALBUTEROL SULFATE SCH MG: 2.5 SOLUTION RESPIRATORY (INHALATION) at 07:46

## 2018-01-13 RX ADMIN — ACETYLCYSTEINE SCH MG: 100 INHALANT RESPIRATORY (INHALATION) at 14:37

## 2018-01-13 RX ADMIN — CARBIDOPA AND LEVODOPA SCH TAB.SA: 25; 100 TABLET, EXTENDED RELEASE ORAL at 21:00

## 2018-01-13 RX ADMIN — POTASSIUM CHLORIDE SCH MLS/HR: 14.9 INJECTION, SOLUTION INTRAVENOUS at 13:36

## 2018-01-13 RX ADMIN — Medication SCH EACH: at 17:00

## 2018-01-13 RX ADMIN — ACETYLCYSTEINE SCH MG: 100 INHALANT RESPIRATORY (INHALATION) at 07:46

## 2018-01-13 RX ADMIN — Medication SCH EACH: at 00:15

## 2018-01-13 RX ADMIN — BETHANECHOL CHLORIDE SCH MG: 25 TABLET ORAL at 12:07

## 2018-01-13 RX ADMIN — Medication SCH EA: at 22:15

## 2018-01-13 RX ADMIN — CARBIDOPA AND LEVODOPA SCH TAB: 25; 250 TABLET ORAL at 08:10

## 2018-01-13 RX ADMIN — Medication SCH EACH: at 08:10

## 2018-01-13 RX ADMIN — BETHANECHOL CHLORIDE SCH MG: 25 TABLET ORAL at 08:10

## 2018-01-13 RX ADMIN — ALBUTEROL SULFATE SCH MG: 2.5 SOLUTION RESPIRATORY (INHALATION) at 19:03

## 2018-01-13 RX ADMIN — Medication SCH EACH: at 11:08

## 2018-01-13 RX ADMIN — ACETAMINOPHEN PRN MG: 325 TABLET ORAL at 17:00

## 2018-01-13 RX ADMIN — SODIUM CHLORIDE PRN UNIT: 9 INJECTION, SOLUTION INTRAVENOUS at 00:21

## 2018-01-13 RX ADMIN — ALBUTEROL SULFATE SCH MG: 2.5 SOLUTION RESPIRATORY (INHALATION) at 11:28

## 2018-01-13 RX ADMIN — TAZOBACTAM SODIUM AND PIPERACILLIN SODIUM SCH MLS/HR: 375; 3 INJECTION, SOLUTION INTRAVENOUS at 00:09

## 2018-01-13 RX ADMIN — SODIUM CHLORIDE PRN UNIT: 9 INJECTION, SOLUTION INTRAVENOUS at 11:13

## 2018-01-13 RX ADMIN — CARBIDOPA AND LEVODOPA SCH TAB: 25; 250 TABLET ORAL at 16:09

## 2018-01-13 RX ADMIN — METOPROLOL SUCCINATE SCH MG: 50 TABLET, FILM COATED, EXTENDED RELEASE ORAL at 22:12

## 2018-01-13 RX ADMIN — TAZOBACTAM SODIUM AND PIPERACILLIN SODIUM SCH MLS/HR: 375; 3 INJECTION, SOLUTION INTRAVENOUS at 05:27

## 2018-01-13 RX ADMIN — IPRATROPIUM BROMIDE SCH MG: 0.5 SOLUTION RESPIRATORY (INHALATION) at 14:37

## 2018-01-13 RX ADMIN — ACETYLCYSTEINE SCH MG: 100 INHALANT RESPIRATORY (INHALATION) at 19:03

## 2018-01-13 RX ADMIN — SODIUM CHLORIDE PRN UNIT: 9 INJECTION, SOLUTION INTRAVENOUS at 17:04

## 2018-01-13 RX ADMIN — TAZOBACTAM SODIUM AND PIPERACILLIN SODIUM SCH MLS/HR: 375; 3 INJECTION, SOLUTION INTRAVENOUS at 17:03

## 2018-01-13 RX ADMIN — BETHANECHOL CHLORIDE SCH MG: 25 TABLET ORAL at 16:09

## 2018-01-13 RX ADMIN — Medication PRN ML: at 22:13

## 2018-01-13 RX ADMIN — PANTOPRAZOLE SODIUM SCH MG: 40 TABLET, DELAYED RELEASE ORAL at 06:00

## 2018-01-13 RX ADMIN — ENALAPRIL MALEATE SCH MG: 5 TABLET ORAL at 17:00

## 2018-01-13 RX ADMIN — IPRATROPIUM BROMIDE SCH MG: 0.5 SOLUTION RESPIRATORY (INHALATION) at 19:03

## 2018-01-13 RX ADMIN — ACETAMINOPHEN PRN MG: 325 TABLET ORAL at 12:02

## 2018-01-13 RX ADMIN — POTASSIUM CHLORIDE SCH MLS/HR: 14.9 INJECTION, SOLUTION INTRAVENOUS at 12:41

## 2018-01-13 RX ADMIN — ASPIRIN SCH MG: 81 TABLET, COATED ORAL at 08:10

## 2018-01-13 RX ADMIN — CARBIDOPA AND LEVODOPA SCH TAB: 25; 250 TABLET ORAL at 12:07

## 2018-01-14 VITALS — DIASTOLIC BLOOD PRESSURE: 64 MMHG | SYSTOLIC BLOOD PRESSURE: 132 MMHG

## 2018-01-14 VITALS — SYSTOLIC BLOOD PRESSURE: 151 MMHG | DIASTOLIC BLOOD PRESSURE: 71 MMHG

## 2018-01-14 VITALS — SYSTOLIC BLOOD PRESSURE: 131 MMHG | DIASTOLIC BLOOD PRESSURE: 71 MMHG

## 2018-01-14 VITALS — SYSTOLIC BLOOD PRESSURE: 155 MMHG | DIASTOLIC BLOOD PRESSURE: 82 MMHG

## 2018-01-14 LAB
ALP SERPL-CCNC: 44 U/L (ref 50–136)
ALT SERPL W/O P-5'-P-CCNC: 12 U/L (ref 16–63)
AST SERPL-CCNC: 18 U/L (ref 15–37)
BASOPHILS # BLD AUTO: 0 K/UL (ref 0–8)
BASOPHILS NFR BLD AUTO: 0.2 % (ref 0–2)
BILIRUB SERPL-MCNC: 0.4 MG/DL (ref 0.2–1)
BUN SERPL-MCNC: 27 MG/DL (ref 7–18)
CHLORIDE SERPL-SCNC: 116 MMOL/L (ref 98–107)
CO2 SERPL-SCNC: 28 MMOL/L (ref 21–32)
CREAT SERPL-MCNC: 1.4 MG/DL (ref 0.6–1.3)
EOSINOPHIL # BLD AUTO: 0 K/UL (ref 0–0.7)
EOSINOPHIL NFR BLD AUTO: 0.4 % (ref 0–7)
GLUCOSE SERPL-MCNC: 302 MG/DL (ref 74–106)
HCT VFR BLD AUTO: 28.3 % (ref 36.7–47.1)
HGB BLD-MCNC: 9.5 G/DL (ref 12.5–16.3)
LYMPHOCYTES # BLD AUTO: 0.5 K/UL (ref 20–40)
LYMPHOCYTES NFR BLD AUTO: 4.3 % (ref 20.5–51.5)
MAGNESIUM SERPL-MCNC: 2.1 MG/DL (ref 1.8–2.4)
MCH RBC QN AUTO: 30.8 UUG (ref 23.8–33.4)
MCHC RBC AUTO-ENTMCNC: 34 G/DL (ref 32.5–36.3)
MCV RBC AUTO: 91.3 FL (ref 73–96.2)
MONOCYTES # BLD AUTO: 0.5 K/UL (ref 2–10)
MONOCYTES NFR BLD AUTO: 4.9 % (ref 0–11)
NEUTROPHILS # BLD AUTO: 9.7 K/UL (ref 1.8–8.9)
NEUTROPHILS NFR BLD AUTO: 90.2 % (ref 38.5–71.5)
PHOSPHATE SERPL-MCNC: 2.7 MG/DL (ref 2.5–4.9)
PLATELET # BLD AUTO: 317 K/UL (ref 152–348)
POTASSIUM SERPL-SCNC: 2.9 MMOL/L (ref 3.5–5.1)
RBC # BLD AUTO: 3.1 MIL/UL (ref 4.06–5.63)
WBC # BLD AUTO: 10.8 K/UL (ref 3.6–10.2)
WS STN SPEC: 6.8 G/DL (ref 6.4–8.2)

## 2018-01-14 RX ADMIN — IPRATROPIUM BROMIDE SCH MG: 0.5 SOLUTION RESPIRATORY (INHALATION) at 07:15

## 2018-01-14 RX ADMIN — ENALAPRIL MALEATE SCH MG: 5 TABLET ORAL at 05:40

## 2018-01-14 RX ADMIN — CARBIDOPA AND LEVODOPA SCH TAB: 25; 250 TABLET ORAL at 12:02

## 2018-01-14 RX ADMIN — ACETAMINOPHEN PRN MG: 325 TABLET ORAL at 16:05

## 2018-01-14 RX ADMIN — Medication SCH EACH: at 21:14

## 2018-01-14 RX ADMIN — SODIUM CHLORIDE PRN UNIT: 9 INJECTION, SOLUTION INTRAVENOUS at 06:02

## 2018-01-14 RX ADMIN — SODIUM CHLORIDE PRN UNIT: 9 INJECTION, SOLUTION INTRAVENOUS at 17:34

## 2018-01-14 RX ADMIN — SODIUM CHLORIDE PRN UNIT: 9 INJECTION, SOLUTION INTRAVENOUS at 00:39

## 2018-01-14 RX ADMIN — Medication SCH EACH: at 12:02

## 2018-01-14 RX ADMIN — SODIUM CHLORIDE PRN UNIT: 9 INJECTION, SOLUTION INTRAVENOUS at 12:05

## 2018-01-14 RX ADMIN — METOPROLOL SUCCINATE SCH MG: 50 TABLET, FILM COATED, EXTENDED RELEASE ORAL at 21:15

## 2018-01-14 RX ADMIN — Medication SCH EACH: at 17:22

## 2018-01-14 RX ADMIN — Medication SCH EA: at 21:18

## 2018-01-14 RX ADMIN — ALBUTEROL SULFATE SCH MG: 2.5 SOLUTION RESPIRATORY (INHALATION) at 07:14

## 2018-01-14 RX ADMIN — ACETAMINOPHEN PRN MG: 325 TABLET ORAL at 05:36

## 2018-01-14 RX ADMIN — ACETYLCYSTEINE SCH MG: 100 INHALANT RESPIRATORY (INHALATION) at 22:38

## 2018-01-14 RX ADMIN — Medication SCH EACH: at 00:34

## 2018-01-14 RX ADMIN — IPRATROPIUM BROMIDE SCH MG: 0.5 SOLUTION RESPIRATORY (INHALATION) at 19:27

## 2018-01-14 RX ADMIN — FLUCONAZOLE SCH MG: 100 TABLET ORAL at 08:25

## 2018-01-14 RX ADMIN — ALBUTEROL SULFATE SCH MG: 2.5 SOLUTION RESPIRATORY (INHALATION) at 19:27

## 2018-01-14 RX ADMIN — CARBIDOPA AND LEVODOPA SCH TAB: 25; 250 TABLET ORAL at 08:28

## 2018-01-14 RX ADMIN — TAZOBACTAM SODIUM AND PIPERACILLIN SODIUM SCH MLS/HR: 375; 3 INJECTION, SOLUTION INTRAVENOUS at 11:54

## 2018-01-14 RX ADMIN — BETHANECHOL CHLORIDE SCH MG: 25 TABLET ORAL at 12:04

## 2018-01-14 RX ADMIN — IPRATROPIUM BROMIDE PRN MG: 0.5 SOLUTION RESPIRATORY (INHALATION) at 22:38

## 2018-01-14 RX ADMIN — ACETYLCYSTEINE SCH MG: 100 INHALANT RESPIRATORY (INHALATION) at 07:15

## 2018-01-14 RX ADMIN — TAZOBACTAM SODIUM AND PIPERACILLIN SODIUM SCH MLS/HR: 375; 3 INJECTION, SOLUTION INTRAVENOUS at 05:42

## 2018-01-14 RX ADMIN — TAZOBACTAM SODIUM AND PIPERACILLIN SODIUM SCH MLS/HR: 375; 3 INJECTION, SOLUTION INTRAVENOUS at 17:18

## 2018-01-14 RX ADMIN — CARBIDOPA AND LEVODOPA SCH TAB: 25; 250 TABLET ORAL at 21:16

## 2018-01-14 RX ADMIN — PANTOPRAZOLE SODIUM SCH MG: 40 TABLET, DELAYED RELEASE ORAL at 06:04

## 2018-01-14 RX ADMIN — TAZOBACTAM SODIUM AND PIPERACILLIN SODIUM SCH MLS/HR: 375; 3 INJECTION, SOLUTION INTRAVENOUS at 00:29

## 2018-01-14 RX ADMIN — ALBUTEROL SULFATE SCH MG: 2.5 SOLUTION RESPIRATORY (INHALATION) at 11:06

## 2018-01-14 RX ADMIN — Medication SCH EACH: at 08:25

## 2018-01-14 RX ADMIN — ALBUTEROL SULFATE SCH MG: 2.5 SOLUTION RESPIRATORY (INHALATION) at 15:36

## 2018-01-14 RX ADMIN — BETHANECHOL CHLORIDE SCH MG: 25 TABLET ORAL at 16:05

## 2018-01-14 RX ADMIN — ACETAMINOPHEN PRN MG: 325 TABLET ORAL at 21:14

## 2018-01-14 RX ADMIN — Medication SCH EACH: at 05:35

## 2018-01-14 RX ADMIN — BETHANECHOL CHLORIDE SCH MG: 25 TABLET ORAL at 08:25

## 2018-01-14 RX ADMIN — CARBIDOPA AND LEVODOPA SCH TAB: 25; 250 TABLET ORAL at 16:06

## 2018-01-14 RX ADMIN — ASPIRIN SCH MG: 81 TABLET, COATED ORAL at 12:03

## 2018-01-14 RX ADMIN — CARBIDOPA AND LEVODOPA SCH TAB.SA: 25; 100 TABLET, EXTENDED RELEASE ORAL at 21:15

## 2018-01-14 RX ADMIN — ACETYLCYSTEINE SCH MG: 100 INHALANT RESPIRATORY (INHALATION) at 15:36

## 2018-01-14 RX ADMIN — IPRATROPIUM BROMIDE SCH MG: 0.5 SOLUTION RESPIRATORY (INHALATION) at 15:36

## 2018-01-14 RX ADMIN — ENALAPRIL MALEATE SCH MG: 5 TABLET ORAL at 17:22

## 2018-01-14 RX ADMIN — IPRATROPIUM BROMIDE SCH MG: 0.5 SOLUTION RESPIRATORY (INHALATION) at 11:06

## 2018-01-15 VITALS — DIASTOLIC BLOOD PRESSURE: 83 MMHG | SYSTOLIC BLOOD PRESSURE: 159 MMHG

## 2018-01-15 VITALS — DIASTOLIC BLOOD PRESSURE: 69 MMHG | SYSTOLIC BLOOD PRESSURE: 133 MMHG

## 2018-01-15 VITALS — DIASTOLIC BLOOD PRESSURE: 72 MMHG | SYSTOLIC BLOOD PRESSURE: 131 MMHG

## 2018-01-15 VITALS — DIASTOLIC BLOOD PRESSURE: 68 MMHG | SYSTOLIC BLOOD PRESSURE: 124 MMHG

## 2018-01-15 LAB
ALP SERPL-CCNC: 43 U/L (ref 50–136)
ALT SERPL W/O P-5'-P-CCNC: 9 U/L (ref 16–63)
AST SERPL-CCNC: 15 U/L (ref 15–37)
BASE EXCESS BLDA CALC-SCNC: 8.1 MMOL/L
BASOPHILS # BLD AUTO: 0 K/UL (ref 0–8)
BASOPHILS NFR BLD AUTO: 0.2 % (ref 0–2)
BILIRUB SERPL-MCNC: 0.4 MG/DL (ref 0.2–1)
BUN SERPL-MCNC: 26 MG/DL (ref 7–18)
CHLORIDE SERPL-SCNC: 118 MMOL/L (ref 98–107)
CO2 SERPL-SCNC: 31 MMOL/L (ref 21–32)
CREAT SERPL-MCNC: 1.4 MG/DL (ref 0.6–1.3)
EOSINOPHIL # BLD AUTO: 0 K/UL (ref 0–0.7)
EOSINOPHIL NFR BLD AUTO: 0.3 % (ref 0–7)
GLUCOSE SERPL-MCNC: 297 MG/DL (ref 74–106)
HCO3 BLDA-SCNC: 31.2 MMOL/L
HCT VFR BLD AUTO: 29.5 % (ref 36.7–47.1)
HGB BLD-MCNC: 9.6 G/DL (ref 12.5–16.3)
HGB BLDA OXIMETRY-MCNC: 9 G/DL (ref 13.5–18)
INHALED O2 CONCENTRATION: 50 %
INHALED O2 FLOW RATE: 8 L/MIN (ref 0–30)
LYMPHOCYTES # BLD AUTO: 0.8 K/UL (ref 20–40)
LYMPHOCYTES NFR BLD AUTO: 6.4 % (ref 20.5–51.5)
MAGNESIUM SERPL-MCNC: 2.2 MG/DL (ref 1.8–2.4)
MCH RBC QN AUTO: 29.7 UUG (ref 23.8–33.4)
MCHC RBC AUTO-ENTMCNC: 33 G/DL (ref 32.5–36.3)
MCV RBC AUTO: 91 FL (ref 73–96.2)
MONOCYTES # BLD AUTO: 0.5 K/UL (ref 2–10)
MONOCYTES NFR BLD AUTO: 4 % (ref 0–11)
NEUTROPHILS # BLD AUTO: 10.6 K/UL (ref 1.8–8.9)
NEUTROPHILS NFR BLD AUTO: 89.1 % (ref 38.5–71.5)
PCO2 TEMP ADJ BLDA: 37.6 MMHG (ref 35–45)
PH TEMP ADJ BLDA: 7.54 [PH] (ref 7.35–7.45)
PHOSPHATE SERPL-MCNC: 2.7 MG/DL (ref 2.5–4.9)
PLATELET # BLD AUTO: 321 K/UL (ref 152–348)
PO2 TEMP ADJ BLDA: 69.9 MMHG (ref 75–100)
POTASSIUM SERPL-SCNC: 2.9 MMOL/L (ref 3.5–5.1)
RBC # BLD AUTO: 3.24 MIL/UL (ref 4.06–5.63)
SPECIMEN DRAWN FROM PATIENT: (no result)
WBC # BLD AUTO: 11.9 K/UL (ref 3.6–10.2)
WS STN SPEC: 7.1 G/DL (ref 6.4–8.2)

## 2018-01-15 RX ADMIN — ACETYLCYSTEINE SCH MG: 100 INHALANT RESPIRATORY (INHALATION) at 07:47

## 2018-01-15 RX ADMIN — IPRATROPIUM BROMIDE SCH MG: 0.5 SOLUTION RESPIRATORY (INHALATION) at 11:34

## 2018-01-15 RX ADMIN — SODIUM CHLORIDE PRN UNIT: 9 INJECTION, SOLUTION INTRAVENOUS at 00:43

## 2018-01-15 RX ADMIN — TAZOBACTAM SODIUM AND PIPERACILLIN SODIUM SCH MLS/HR: 375; 3 INJECTION, SOLUTION INTRAVENOUS at 17:00

## 2018-01-15 RX ADMIN — ENALAPRIL MALEATE SCH MG: 5 TABLET ORAL at 06:10

## 2018-01-15 RX ADMIN — IPRATROPIUM BROMIDE SCH MG: 0.5 SOLUTION RESPIRATORY (INHALATION) at 15:53

## 2018-01-15 RX ADMIN — FLUCONAZOLE SCH MG: 100 TABLET ORAL at 08:23

## 2018-01-15 RX ADMIN — CARBIDOPA AND LEVODOPA SCH TAB: 25; 250 TABLET ORAL at 12:43

## 2018-01-15 RX ADMIN — Medication SCH MG: at 20:53

## 2018-01-15 RX ADMIN — PANTOPRAZOLE SODIUM SCH MG: 40 TABLET, DELAYED RELEASE ORAL at 07:18

## 2018-01-15 RX ADMIN — IPRATROPIUM BROMIDE PRN MG: 0.5 SOLUTION RESPIRATORY (INHALATION) at 04:24

## 2018-01-15 RX ADMIN — Medication SCH EACH: at 20:54

## 2018-01-15 RX ADMIN — Medication SCH MG: at 12:49

## 2018-01-15 RX ADMIN — Medication SCH EA: at 20:54

## 2018-01-15 RX ADMIN — LINAGLIPTIN SCH MG: 5 TABLET, FILM COATED ORAL at 12:49

## 2018-01-15 RX ADMIN — Medication SCH EACH: at 23:54

## 2018-01-15 RX ADMIN — CARBIDOPA AND LEVODOPA SCH TAB: 25; 250 TABLET ORAL at 20:53

## 2018-01-15 RX ADMIN — TAZOBACTAM SODIUM AND PIPERACILLIN SODIUM SCH MLS/HR: 375; 3 INJECTION, SOLUTION INTRAVENOUS at 23:41

## 2018-01-15 RX ADMIN — ALBUTEROL SULFATE SCH MG: 2.5 SOLUTION RESPIRATORY (INHALATION) at 15:53

## 2018-01-15 RX ADMIN — SODIUM CHLORIDE PRN UNIT: 9 INJECTION, SOLUTION INTRAVENOUS at 16:43

## 2018-01-15 RX ADMIN — CARBIDOPA AND LEVODOPA SCH TAB.SA: 25; 100 TABLET, EXTENDED RELEASE ORAL at 20:54

## 2018-01-15 RX ADMIN — BETHANECHOL CHLORIDE SCH MG: 25 TABLET ORAL at 12:42

## 2018-01-15 RX ADMIN — CARBIDOPA AND LEVODOPA SCH TAB: 25; 250 TABLET ORAL at 16:39

## 2018-01-15 RX ADMIN — ALBUTEROL SULFATE SCH MG: 2.5 SOLUTION RESPIRATORY (INHALATION) at 11:34

## 2018-01-15 RX ADMIN — ALBUTEROL SULFATE SCH MG: 2.5 SOLUTION RESPIRATORY (INHALATION) at 23:07

## 2018-01-15 RX ADMIN — IPRATROPIUM BROMIDE SCH MG: 0.5 SOLUTION RESPIRATORY (INHALATION) at 07:46

## 2018-01-15 RX ADMIN — Medication SCH EACH: at 16:41

## 2018-01-15 RX ADMIN — Medication SCH EACH: at 06:11

## 2018-01-15 RX ADMIN — TAZOBACTAM SODIUM AND PIPERACILLIN SODIUM SCH MLS/HR: 375; 3 INJECTION, SOLUTION INTRAVENOUS at 00:29

## 2018-01-15 RX ADMIN — ASPIRIN SCH MG: 81 TABLET, COATED ORAL at 08:23

## 2018-01-15 RX ADMIN — CARBIDOPA AND LEVODOPA SCH TAB: 25; 250 TABLET ORAL at 08:23

## 2018-01-15 RX ADMIN — TAZOBACTAM SODIUM AND PIPERACILLIN SODIUM SCH MLS/HR: 375; 3 INJECTION, SOLUTION INTRAVENOUS at 12:21

## 2018-01-15 RX ADMIN — BETHANECHOL CHLORIDE SCH MG: 25 TABLET ORAL at 16:38

## 2018-01-15 RX ADMIN — SODIUM CHLORIDE PRN UNIT: 9 INJECTION, SOLUTION INTRAVENOUS at 12:55

## 2018-01-15 RX ADMIN — BETHANECHOL CHLORIDE SCH MG: 25 TABLET ORAL at 08:23

## 2018-01-15 RX ADMIN — IPRATROPIUM BROMIDE SCH MG: 0.5 SOLUTION RESPIRATORY (INHALATION) at 23:07

## 2018-01-15 RX ADMIN — Medication SCH EACH: at 08:22

## 2018-01-15 RX ADMIN — Medication SCH EACH: at 12:26

## 2018-01-15 RX ADMIN — Medication SCH EACH: at 00:37

## 2018-01-15 RX ADMIN — ALBUTEROL SULFATE PRN MG: 2.5 SOLUTION RESPIRATORY (INHALATION) at 04:24

## 2018-01-15 RX ADMIN — ACETAMINOPHEN PRN MG: 325 TABLET ORAL at 09:53

## 2018-01-15 RX ADMIN — SODIUM CHLORIDE PRN UNIT: 9 INJECTION, SOLUTION INTRAVENOUS at 06:33

## 2018-01-15 RX ADMIN — SODIUM CHLORIDE PRN UNIT: 9 INJECTION, SOLUTION INTRAVENOUS at 23:59

## 2018-01-15 RX ADMIN — ALBUTEROL SULFATE SCH MG: 2.5 SOLUTION RESPIRATORY (INHALATION) at 07:45

## 2018-01-15 RX ADMIN — TAZOBACTAM SODIUM AND PIPERACILLIN SODIUM SCH MLS/HR: 375; 3 INJECTION, SOLUTION INTRAVENOUS at 06:10

## 2018-01-15 RX ADMIN — ENALAPRIL MALEATE SCH MG: 5 TABLET ORAL at 17:00

## 2018-01-16 VITALS — DIASTOLIC BLOOD PRESSURE: 54 MMHG | SYSTOLIC BLOOD PRESSURE: 121 MMHG

## 2018-01-16 VITALS — DIASTOLIC BLOOD PRESSURE: 71 MMHG | SYSTOLIC BLOOD PRESSURE: 126 MMHG

## 2018-01-16 VITALS — DIASTOLIC BLOOD PRESSURE: 61 MMHG | SYSTOLIC BLOOD PRESSURE: 126 MMHG

## 2018-01-16 VITALS — DIASTOLIC BLOOD PRESSURE: 64 MMHG | SYSTOLIC BLOOD PRESSURE: 127 MMHG

## 2018-01-16 VITALS — SYSTOLIC BLOOD PRESSURE: 111 MMHG | DIASTOLIC BLOOD PRESSURE: 57 MMHG

## 2018-01-16 LAB
ALP SERPL-CCNC: 45 U/L (ref 50–136)
ALT SERPL W/O P-5'-P-CCNC: 6 U/L (ref 16–63)
AST SERPL-CCNC: 17 U/L (ref 15–37)
BASOPHILS # BLD AUTO: 0 K/UL (ref 0–8)
BASOPHILS NFR BLD AUTO: 0.2 % (ref 0–2)
BILIRUB SERPL-MCNC: 0.3 MG/DL (ref 0.2–1)
BUN SERPL-MCNC: 34 MG/DL (ref 7–18)
CHLORIDE SERPL-SCNC: 118 MMOL/L (ref 98–107)
CO2 SERPL-SCNC: 31 MMOL/L (ref 21–32)
CREAT SERPL-MCNC: 1.5 MG/DL (ref 0.6–1.3)
EOSINOPHIL # BLD AUTO: 0.1 K/UL (ref 0–0.7)
EOSINOPHIL NFR BLD AUTO: 0.7 % (ref 0–7)
GLUCOSE SERPL-MCNC: 387 MG/DL (ref 74–106)
HCT VFR BLD AUTO: 28.8 % (ref 36.7–47.1)
HGB BLD-MCNC: 9.5 G/DL (ref 12.5–16.3)
LYMPHOCYTES # BLD AUTO: 0.6 K/UL (ref 20–40)
LYMPHOCYTES NFR BLD AUTO: 6.8 % (ref 20.5–51.5)
MAGNESIUM SERPL-MCNC: 2.3 MG/DL (ref 1.8–2.4)
MCH RBC QN AUTO: 30.4 UUG (ref 23.8–33.4)
MCHC RBC AUTO-ENTMCNC: 33 G/DL (ref 32.5–36.3)
MCV RBC AUTO: 92 FL (ref 73–96.2)
MONOCYTES # BLD AUTO: 0.5 K/UL (ref 2–10)
MONOCYTES NFR BLD AUTO: 5 % (ref 0–11)
NEUTROPHILS # BLD AUTO: 8.3 K/UL (ref 1.8–8.9)
NEUTROPHILS NFR BLD AUTO: 87.3 % (ref 38.5–71.5)
PHOSPHATE SERPL-MCNC: 2.9 MG/DL (ref 2.5–4.9)
PLATELET # BLD AUTO: 297 K/UL (ref 152–348)
POTASSIUM SERPL-SCNC: 3.1 MMOL/L (ref 3.5–5.1)
RBC # BLD AUTO: 3.13 MIL/UL (ref 4.06–5.63)
WBC # BLD AUTO: 9.5 K/UL (ref 3.6–10.2)
WS STN SPEC: 7.1 G/DL (ref 6.4–8.2)

## 2018-01-16 RX ADMIN — ALBUTEROL SULFATE SCH MG: 2.5 SOLUTION RESPIRATORY (INHALATION) at 15:04

## 2018-01-16 RX ADMIN — Medication SCH EA: at 20:37

## 2018-01-16 RX ADMIN — CARBIDOPA AND LEVODOPA SCH TAB.SA: 25; 100 TABLET, EXTENDED RELEASE ORAL at 20:39

## 2018-01-16 RX ADMIN — IPRATROPIUM BROMIDE SCH MG: 0.5 SOLUTION RESPIRATORY (INHALATION) at 11:00

## 2018-01-16 RX ADMIN — SODIUM CHLORIDE PRN UNIT: 9 INJECTION, SOLUTION INTRAVENOUS at 11:16

## 2018-01-16 RX ADMIN — Medication SCH MG: at 20:39

## 2018-01-16 RX ADMIN — CARBIDOPA AND LEVODOPA SCH TAB: 25; 250 TABLET ORAL at 08:33

## 2018-01-16 RX ADMIN — FLUCONAZOLE SCH MG: 100 TABLET ORAL at 08:33

## 2018-01-16 RX ADMIN — LINAGLIPTIN SCH MG: 5 TABLET, FILM COATED ORAL at 08:33

## 2018-01-16 RX ADMIN — IPRATROPIUM BROMIDE SCH MG: 0.5 SOLUTION RESPIRATORY (INHALATION) at 15:04

## 2018-01-16 RX ADMIN — BETHANECHOL CHLORIDE SCH MG: 25 TABLET ORAL at 17:15

## 2018-01-16 RX ADMIN — TAZOBACTAM SODIUM AND PIPERACILLIN SODIUM SCH MLS/HR: 375; 3 INJECTION, SOLUTION INTRAVENOUS at 11:08

## 2018-01-16 RX ADMIN — ALBUTEROL SULFATE SCH MG: 2.5 SOLUTION RESPIRATORY (INHALATION) at 11:00

## 2018-01-16 RX ADMIN — Medication SCH EACH: at 20:39

## 2018-01-16 RX ADMIN — BETHANECHOL CHLORIDE SCH MG: 25 TABLET ORAL at 08:33

## 2018-01-16 RX ADMIN — CARBIDOPA AND LEVODOPA SCH TAB: 25; 250 TABLET ORAL at 13:00

## 2018-01-16 RX ADMIN — Medication SCH EACH: at 11:14

## 2018-01-16 RX ADMIN — CARBIDOPA AND LEVODOPA SCH TAB: 25; 250 TABLET ORAL at 20:38

## 2018-01-16 RX ADMIN — ALBUTEROL SULFATE SCH MG: 2.5 SOLUTION RESPIRATORY (INHALATION) at 19:02

## 2018-01-16 RX ADMIN — SODIUM CHLORIDE PRN UNIT: 9 INJECTION, SOLUTION INTRAVENOUS at 17:20

## 2018-01-16 RX ADMIN — Medication SCH EACH: at 05:22

## 2018-01-16 RX ADMIN — IPRATROPIUM BROMIDE SCH MG: 0.5 SOLUTION RESPIRATORY (INHALATION) at 19:01

## 2018-01-16 RX ADMIN — BETHANECHOL CHLORIDE SCH MG: 25 TABLET ORAL at 13:00

## 2018-01-16 RX ADMIN — PANTOPRAZOLE SODIUM SCH MG: 40 GRANULE, DELAYED RELEASE ORAL at 05:44

## 2018-01-16 RX ADMIN — DEXTROSE PRN MLS/HR: 50 INJECTION, SOLUTION INTRAVENOUS at 17:25

## 2018-01-16 RX ADMIN — CARBIDOPA AND LEVODOPA SCH TAB: 25; 250 TABLET ORAL at 17:16

## 2018-01-16 RX ADMIN — ALBUTEROL SULFATE SCH MG: 2.5 SOLUTION RESPIRATORY (INHALATION) at 07:32

## 2018-01-16 RX ADMIN — IPRATROPIUM BROMIDE SCH MG: 0.5 SOLUTION RESPIRATORY (INHALATION) at 07:32

## 2018-01-16 RX ADMIN — TAZOBACTAM SODIUM AND PIPERACILLIN SODIUM SCH MLS/HR: 375; 3 INJECTION, SOLUTION INTRAVENOUS at 17:25

## 2018-01-16 RX ADMIN — ENALAPRIL MALEATE SCH MG: 5 TABLET ORAL at 17:15

## 2018-01-16 RX ADMIN — TAZOBACTAM SODIUM AND PIPERACILLIN SODIUM SCH MLS/HR: 375; 3 INJECTION, SOLUTION INTRAVENOUS at 05:24

## 2018-01-16 RX ADMIN — Medication SCH MG: at 08:33

## 2018-01-16 RX ADMIN — ASPIRIN SCH MG: 81 TABLET, CHEWABLE ORAL at 08:33

## 2018-01-16 RX ADMIN — Medication SCH EACH: at 17:18

## 2018-01-16 RX ADMIN — ENALAPRIL MALEATE SCH MG: 5 TABLET ORAL at 05:44

## 2018-01-16 RX ADMIN — SODIUM CHLORIDE PRN UNIT: 9 INJECTION, SOLUTION INTRAVENOUS at 06:44

## 2018-01-16 RX ADMIN — Medication SCH EACH: at 08:34

## 2018-01-17 VITALS — SYSTOLIC BLOOD PRESSURE: 143 MMHG | DIASTOLIC BLOOD PRESSURE: 79 MMHG

## 2018-01-17 VITALS — SYSTOLIC BLOOD PRESSURE: 145 MMHG | DIASTOLIC BLOOD PRESSURE: 103 MMHG

## 2018-01-17 VITALS — SYSTOLIC BLOOD PRESSURE: 122 MMHG | DIASTOLIC BLOOD PRESSURE: 62 MMHG

## 2018-01-17 VITALS — SYSTOLIC BLOOD PRESSURE: 132 MMHG | DIASTOLIC BLOOD PRESSURE: 70 MMHG

## 2018-01-17 VITALS — DIASTOLIC BLOOD PRESSURE: 73 MMHG | SYSTOLIC BLOOD PRESSURE: 131 MMHG

## 2018-01-17 VITALS — SYSTOLIC BLOOD PRESSURE: 127 MMHG | DIASTOLIC BLOOD PRESSURE: 69 MMHG

## 2018-01-17 VITALS — DIASTOLIC BLOOD PRESSURE: 73 MMHG | SYSTOLIC BLOOD PRESSURE: 138 MMHG

## 2018-01-17 VITALS — DIASTOLIC BLOOD PRESSURE: 82 MMHG | SYSTOLIC BLOOD PRESSURE: 119 MMHG

## 2018-01-17 VITALS — SYSTOLIC BLOOD PRESSURE: 133 MMHG | DIASTOLIC BLOOD PRESSURE: 78 MMHG

## 2018-01-17 VITALS — SYSTOLIC BLOOD PRESSURE: 137 MMHG | DIASTOLIC BLOOD PRESSURE: 73 MMHG

## 2018-01-17 VITALS — SYSTOLIC BLOOD PRESSURE: 148 MMHG | DIASTOLIC BLOOD PRESSURE: 82 MMHG

## 2018-01-17 VITALS — DIASTOLIC BLOOD PRESSURE: 72 MMHG | SYSTOLIC BLOOD PRESSURE: 120 MMHG

## 2018-01-17 VITALS — SYSTOLIC BLOOD PRESSURE: 134 MMHG | DIASTOLIC BLOOD PRESSURE: 69 MMHG

## 2018-01-17 VITALS — DIASTOLIC BLOOD PRESSURE: 74 MMHG | SYSTOLIC BLOOD PRESSURE: 136 MMHG

## 2018-01-17 VITALS — DIASTOLIC BLOOD PRESSURE: 68 MMHG | SYSTOLIC BLOOD PRESSURE: 131 MMHG

## 2018-01-17 VITALS — SYSTOLIC BLOOD PRESSURE: 143 MMHG | DIASTOLIC BLOOD PRESSURE: 84 MMHG

## 2018-01-17 VITALS — SYSTOLIC BLOOD PRESSURE: 150 MMHG | DIASTOLIC BLOOD PRESSURE: 76 MMHG

## 2018-01-17 VITALS — SYSTOLIC BLOOD PRESSURE: 133 MMHG | DIASTOLIC BLOOD PRESSURE: 66 MMHG

## 2018-01-17 LAB
ALP SERPL-CCNC: 52 U/L (ref 50–136)
ALT SERPL W/O P-5'-P-CCNC: 15 U/L (ref 16–63)
AST SERPL-CCNC: 26 U/L (ref 15–37)
BASE EXCESS BLDA CALC-SCNC: 7.5 MMOL/L
BASOPHILS # BLD AUTO: 0 K/UL (ref 0–8)
BASOPHILS NFR BLD AUTO: 0.6 % (ref 0–2)
BILIRUB SERPL-MCNC: 0.3 MG/DL (ref 0.2–1)
BUN SERPL-MCNC: 44 MG/DL (ref 7–18)
CHLORIDE SERPL-SCNC: 120 MMOL/L (ref 98–107)
CO2 SERPL-SCNC: 32 MMOL/L (ref 21–32)
CREAT SERPL-MCNC: 1.6 MG/DL (ref 0.6–1.3)
EOSINOPHIL # BLD AUTO: 0.1 K/UL (ref 0–0.7)
EOSINOPHIL NFR BLD AUTO: 1.4 % (ref 0–7)
GLUCOSE SERPL-MCNC: 358 MG/DL (ref 74–106)
HCO3 BLDA-SCNC: 31.6 MMOL/L
HCT VFR BLD AUTO: 29.9 % (ref 36.7–47.1)
HGB BLD-MCNC: 9.7 G/DL (ref 12.5–16.3)
HGB BLDA OXIMETRY-MCNC: 8.1 G/DL (ref 13.5–18)
INHALED O2 CONCENTRATION: 50 %
INHALED O2 FLOW RATE: 8 L/MIN (ref 0–30)
LYMPHOCYTES # BLD AUTO: 0.9 K/UL (ref 20–40)
LYMPHOCYTES NFR BLD AUTO: 11.8 % (ref 20.5–51.5)
MAGNESIUM SERPL-MCNC: 2.5 MG/DL (ref 1.8–2.4)
MCH RBC QN AUTO: 29.7 UUG (ref 23.8–33.4)
MCHC RBC AUTO-ENTMCNC: 32 G/DL (ref 32.5–36.3)
MCV RBC AUTO: 92.1 FL (ref 73–96.2)
MONOCYTES # BLD AUTO: 0.5 K/UL (ref 2–10)
MONOCYTES NFR BLD AUTO: 6.3 % (ref 0–11)
NEUTROPHILS # BLD AUTO: 5.9 K/UL (ref 1.8–8.9)
NEUTROPHILS NFR BLD AUTO: 79.9 % (ref 38.5–71.5)
PCO2 TEMP ADJ BLDA: 43 MMHG (ref 35–45)
PH TEMP ADJ BLDA: 7.48 [PH] (ref 7.35–7.45)
PHOSPHATE SERPL-MCNC: 2.8 MG/DL (ref 2.5–4.9)
PLATELET # BLD AUTO: 295 K/UL (ref 152–348)
PO2 TEMP ADJ BLDA: 64.6 MMHG (ref 75–100)
POTASSIUM SERPL-SCNC: 3.4 MMOL/L (ref 3.5–5.1)
RBC # BLD AUTO: 3.25 MIL/UL (ref 4.06–5.63)
SPECIMEN DRAWN FROM PATIENT: (no result)
WBC # BLD AUTO: 7.3 K/UL (ref 3.6–10.2)
WS STN SPEC: 7.2 G/DL (ref 6.4–8.2)

## 2018-01-17 RX ADMIN — INSULIN DETEMIR SCH UNIT: 100 INJECTION, SOLUTION SUBCUTANEOUS at 20:28

## 2018-01-17 RX ADMIN — SODIUM CHLORIDE PRN UNIT: 9 INJECTION, SOLUTION INTRAVENOUS at 23:57

## 2018-01-17 RX ADMIN — Medication SCH EACH: at 23:54

## 2018-01-17 RX ADMIN — CARBIDOPA AND LEVODOPA SCH TAB: 25; 250 TABLET ORAL at 17:15

## 2018-01-17 RX ADMIN — SODIUM CHLORIDE SCH MLS/HR: 9 INJECTION, SOLUTION INTRAVENOUS at 13:21

## 2018-01-17 RX ADMIN — Medication SCH EACH: at 12:39

## 2018-01-17 RX ADMIN — LINAGLIPTIN SCH MG: 5 TABLET, FILM COATED ORAL at 08:44

## 2018-01-17 RX ADMIN — PANTOPRAZOLE SODIUM SCH MG: 40 GRANULE, DELAYED RELEASE ORAL at 05:01

## 2018-01-17 RX ADMIN — SODIUM CHLORIDE PRN UNIT: 9 INJECTION, SOLUTION INTRAVENOUS at 07:45

## 2018-01-17 RX ADMIN — ALBUTEROL SULFATE SCH MG: 2.5 SOLUTION RESPIRATORY (INHALATION) at 07:36

## 2018-01-17 RX ADMIN — DEXTROSE PRN MLS/HR: 50 INJECTION, SOLUTION INTRAVENOUS at 21:36

## 2018-01-17 RX ADMIN — ENALAPRIL MALEATE SCH MG: 5 TABLET ORAL at 06:00

## 2018-01-17 RX ADMIN — Medication SCH MG: at 20:32

## 2018-01-17 RX ADMIN — CARBIDOPA AND LEVODOPA SCH TAB: 25; 250 TABLET ORAL at 08:24

## 2018-01-17 RX ADMIN — ASPIRIN SCH MG: 81 TABLET, CHEWABLE ORAL at 08:34

## 2018-01-17 RX ADMIN — BETHANECHOL CHLORIDE SCH MG: 25 TABLET ORAL at 08:43

## 2018-01-17 RX ADMIN — CARBIDOPA AND LEVODOPA SCH TAB: 25; 250 TABLET ORAL at 20:32

## 2018-01-17 RX ADMIN — IPRATROPIUM BROMIDE SCH MG: 0.5 SOLUTION RESPIRATORY (INHALATION) at 07:36

## 2018-01-17 RX ADMIN — ALBUTEROL SULFATE SCH MG: 2.5 SOLUTION RESPIRATORY (INHALATION) at 11:39

## 2018-01-17 RX ADMIN — ACETAMINOPHEN PRN MG: 325 TABLET ORAL at 08:43

## 2018-01-17 RX ADMIN — SODIUM CHLORIDE PRN UNIT: 9 INJECTION, SOLUTION INTRAVENOUS at 17:24

## 2018-01-17 RX ADMIN — MORPHINE SULFATE PRN MG: 4 INJECTION, SOLUTION INTRAMUSCULAR; INTRAVENOUS at 12:23

## 2018-01-17 RX ADMIN — Medication SCH EACH: at 08:23

## 2018-01-17 RX ADMIN — ALBUTEROL SULFATE SCH MG: 2.5 SOLUTION RESPIRATORY (INHALATION) at 15:41

## 2018-01-17 RX ADMIN — DEXTROSE PRN MLS/HR: 50 INJECTION, SOLUTION INTRAVENOUS at 07:41

## 2018-01-17 RX ADMIN — Medication SCH EACH: at 20:32

## 2018-01-17 RX ADMIN — SODIUM CHLORIDE PRN UNIT: 9 INJECTION, SOLUTION INTRAVENOUS at 12:33

## 2018-01-17 RX ADMIN — MORPHINE SULFATE PRN MG: 4 INJECTION, SOLUTION INTRAMUSCULAR; INTRAVENOUS at 08:26

## 2018-01-17 RX ADMIN — IPRATROPIUM BROMIDE SCH MG: 0.5 SOLUTION RESPIRATORY (INHALATION) at 15:41

## 2018-01-17 RX ADMIN — Medication SCH EACH: at 17:23

## 2018-01-17 RX ADMIN — Medication SCH EACH: at 00:11

## 2018-01-17 RX ADMIN — Medication PRN ML: at 02:52

## 2018-01-17 RX ADMIN — IPRATROPIUM BROMIDE SCH MG: 0.5 SOLUTION RESPIRATORY (INHALATION) at 19:17

## 2018-01-17 RX ADMIN — FLUCONAZOLE SCH MG: 100 TABLET ORAL at 08:24

## 2018-01-17 RX ADMIN — Medication SCH MG: at 08:24

## 2018-01-17 RX ADMIN — IPRATROPIUM BROMIDE SCH MG: 0.5 SOLUTION RESPIRATORY (INHALATION) at 11:39

## 2018-01-17 RX ADMIN — CARBIDOPA AND LEVODOPA SCH TAB: 25; 250 TABLET ORAL at 12:35

## 2018-01-17 RX ADMIN — ALBUTEROL SULFATE SCH MG: 2.5 SOLUTION RESPIRATORY (INHALATION) at 19:17

## 2018-01-17 RX ADMIN — Medication SCH EA: at 20:33

## 2018-01-17 RX ADMIN — BETHANECHOL CHLORIDE SCH MG: 25 TABLET ORAL at 17:16

## 2018-01-17 RX ADMIN — CARBIDOPA AND LEVODOPA SCH TAB.SA: 25; 100 TABLET, EXTENDED RELEASE ORAL at 20:31

## 2018-01-17 RX ADMIN — Medication SCH EACH: at 07:42

## 2018-01-17 RX ADMIN — SODIUM CHLORIDE PRN UNIT: 9 INJECTION, SOLUTION INTRAVENOUS at 00:00

## 2018-01-17 RX ADMIN — PANTOPRAZOLE SODIUM SCH MG: 40 GRANULE, DELAYED RELEASE ORAL at 06:00

## 2018-01-17 RX ADMIN — ENALAPRIL MALEATE SCH MG: 5 TABLET ORAL at 05:01

## 2018-01-17 RX ADMIN — BETHANECHOL CHLORIDE SCH MG: 25 TABLET ORAL at 12:35

## 2018-01-17 RX ADMIN — DEXTROSE PRN MLS/HR: 50 INJECTION, SOLUTION INTRAVENOUS at 09:51

## 2018-01-17 RX ADMIN — ENALAPRIL MALEATE SCH MG: 5 TABLET ORAL at 17:17

## 2018-01-17 RX ADMIN — ASPIRIN SCH MG: 81 TABLET, CHEWABLE ORAL at 08:24

## 2018-01-18 VITALS — SYSTOLIC BLOOD PRESSURE: 136 MMHG | DIASTOLIC BLOOD PRESSURE: 76 MMHG

## 2018-01-18 VITALS — SYSTOLIC BLOOD PRESSURE: 137 MMHG | DIASTOLIC BLOOD PRESSURE: 78 MMHG

## 2018-01-18 VITALS — DIASTOLIC BLOOD PRESSURE: 93 MMHG | SYSTOLIC BLOOD PRESSURE: 145 MMHG

## 2018-01-18 VITALS — SYSTOLIC BLOOD PRESSURE: 135 MMHG | DIASTOLIC BLOOD PRESSURE: 69 MMHG

## 2018-01-18 VITALS — SYSTOLIC BLOOD PRESSURE: 139 MMHG | DIASTOLIC BLOOD PRESSURE: 89 MMHG

## 2018-01-18 VITALS — DIASTOLIC BLOOD PRESSURE: 72 MMHG | SYSTOLIC BLOOD PRESSURE: 124 MMHG

## 2018-01-18 VITALS — SYSTOLIC BLOOD PRESSURE: 131 MMHG | DIASTOLIC BLOOD PRESSURE: 76 MMHG

## 2018-01-18 VITALS — SYSTOLIC BLOOD PRESSURE: 142 MMHG | DIASTOLIC BLOOD PRESSURE: 78 MMHG

## 2018-01-18 VITALS — SYSTOLIC BLOOD PRESSURE: 138 MMHG | DIASTOLIC BLOOD PRESSURE: 78 MMHG

## 2018-01-18 VITALS — DIASTOLIC BLOOD PRESSURE: 70 MMHG | SYSTOLIC BLOOD PRESSURE: 129 MMHG

## 2018-01-18 VITALS — SYSTOLIC BLOOD PRESSURE: 134 MMHG | DIASTOLIC BLOOD PRESSURE: 74 MMHG

## 2018-01-18 VITALS — SYSTOLIC BLOOD PRESSURE: 117 MMHG | DIASTOLIC BLOOD PRESSURE: 53 MMHG

## 2018-01-18 VITALS — SYSTOLIC BLOOD PRESSURE: 123 MMHG | DIASTOLIC BLOOD PRESSURE: 67 MMHG

## 2018-01-18 VITALS — SYSTOLIC BLOOD PRESSURE: 140 MMHG | DIASTOLIC BLOOD PRESSURE: 59 MMHG

## 2018-01-18 VITALS — DIASTOLIC BLOOD PRESSURE: 79 MMHG | SYSTOLIC BLOOD PRESSURE: 136 MMHG

## 2018-01-18 VITALS — SYSTOLIC BLOOD PRESSURE: 137 MMHG | DIASTOLIC BLOOD PRESSURE: 92 MMHG

## 2018-01-18 VITALS — SYSTOLIC BLOOD PRESSURE: 129 MMHG | DIASTOLIC BLOOD PRESSURE: 72 MMHG

## 2018-01-18 VITALS — SYSTOLIC BLOOD PRESSURE: 113 MMHG | DIASTOLIC BLOOD PRESSURE: 54 MMHG

## 2018-01-18 VITALS — SYSTOLIC BLOOD PRESSURE: 138 MMHG | DIASTOLIC BLOOD PRESSURE: 73 MMHG

## 2018-01-18 VITALS — SYSTOLIC BLOOD PRESSURE: 129 MMHG | DIASTOLIC BLOOD PRESSURE: 78 MMHG

## 2018-01-18 VITALS — SYSTOLIC BLOOD PRESSURE: 127 MMHG | DIASTOLIC BLOOD PRESSURE: 71 MMHG

## 2018-01-18 VITALS — DIASTOLIC BLOOD PRESSURE: 74 MMHG | SYSTOLIC BLOOD PRESSURE: 129 MMHG

## 2018-01-18 VITALS — DIASTOLIC BLOOD PRESSURE: 70 MMHG | SYSTOLIC BLOOD PRESSURE: 135 MMHG

## 2018-01-18 LAB
ALP SERPL-CCNC: 43 U/L (ref 50–136)
ALT SERPL W/O P-5'-P-CCNC: < 6 U/L (ref 16–63)
AST SERPL-CCNC: 23 U/L (ref 15–37)
BASOPHILS # BLD AUTO: 0 K/UL (ref 0–8)
BASOPHILS NFR BLD AUTO: 0.4 % (ref 0–2)
BILIRUB SERPL-MCNC: 0.3 MG/DL (ref 0.2–1)
BUN SERPL-MCNC: 29 MG/DL (ref 7–18)
CHLORIDE SERPL-SCNC: 117 MMOL/L (ref 98–107)
CO2 SERPL-SCNC: 31 MMOL/L (ref 21–32)
CREAT SERPL-MCNC: 1.1 MG/DL (ref 0.6–1.3)
EOSINOPHIL # BLD AUTO: 0.1 K/UL (ref 0–0.7)
EOSINOPHIL NFR BLD AUTO: 1.7 % (ref 0–7)
GLUCOSE SERPL-MCNC: 149 MG/DL (ref 74–106)
HCT VFR BLD AUTO: 26.3 % (ref 36.7–47.1)
HGB BLD-MCNC: 8.5 G/DL (ref 12.5–16.3)
LYMPHOCYTES # BLD AUTO: 0.8 K/UL (ref 20–40)
LYMPHOCYTES NFR BLD AUTO: 9.3 % (ref 20.5–51.5)
MAGNESIUM SERPL-MCNC: 2.4 MG/DL (ref 1.8–2.4)
MCH RBC QN AUTO: 29.5 UUG (ref 23.8–33.4)
MCHC RBC AUTO-ENTMCNC: 32 G/DL (ref 32.5–36.3)
MCV RBC AUTO: 91.1 FL (ref 73–96.2)
MONOCYTES # BLD AUTO: 0.5 K/UL (ref 2–10)
MONOCYTES NFR BLD AUTO: 5.8 % (ref 0–11)
NEUTROPHILS # BLD AUTO: 6.7 K/UL (ref 1.8–8.9)
NEUTROPHILS NFR BLD AUTO: 82.8 % (ref 38.5–71.5)
PHOSPHATE SERPL-MCNC: 3.4 MG/DL (ref 2.5–4.9)
PLATELET # BLD AUTO: 233 K/UL (ref 152–348)
POTASSIUM SERPL-SCNC: 3.4 MMOL/L (ref 3.5–5.1)
RBC # BLD AUTO: 2.88 MIL/UL (ref 4.06–5.63)
WBC # BLD AUTO: 8.1 K/UL (ref 3.6–10.2)
WS STN SPEC: 6.5 G/DL (ref 6.4–8.2)

## 2018-01-18 PROCEDURE — 0D9670Z DRAINAGE OF STOMACH WITH DRAINAGE DEVICE, VIA NATURAL OR ARTIFICIAL OPENING: ICD-10-PCS | Performed by: INTERNAL MEDICINE

## 2018-01-18 RX ADMIN — CARBIDOPA AND LEVODOPA SCH TAB: 25; 250 TABLET ORAL at 12:11

## 2018-01-18 RX ADMIN — Medication PRN ML: at 17:48

## 2018-01-18 RX ADMIN — SODIUM CHLORIDE SCH MLS/HR: 9 INJECTION, SOLUTION INTRAVENOUS at 01:19

## 2018-01-18 RX ADMIN — Medication SCH MG: at 21:00

## 2018-01-18 RX ADMIN — Medication SCH EA: at 21:02

## 2018-01-18 RX ADMIN — ALBUTEROL SULFATE SCH MG: 2.5 SOLUTION RESPIRATORY (INHALATION) at 14:59

## 2018-01-18 RX ADMIN — CARBIDOPA AND LEVODOPA SCH TAB: 25; 250 TABLET ORAL at 08:10

## 2018-01-18 RX ADMIN — LINAGLIPTIN SCH MG: 5 TABLET, FILM COATED ORAL at 08:11

## 2018-01-18 RX ADMIN — Medication SCH MG: at 08:09

## 2018-01-18 RX ADMIN — ASPIRIN SCH MG: 81 TABLET, CHEWABLE ORAL at 08:08

## 2018-01-18 RX ADMIN — ALBUTEROL SULFATE SCH MG: 2.5 SOLUTION RESPIRATORY (INHALATION) at 10:55

## 2018-01-18 RX ADMIN — IPRATROPIUM BROMIDE SCH MG: 0.5 SOLUTION RESPIRATORY (INHALATION) at 07:47

## 2018-01-18 RX ADMIN — INSULIN DETEMIR SCH UNIT: 100 INJECTION, SOLUTION SUBCUTANEOUS at 21:04

## 2018-01-18 RX ADMIN — BETHANECHOL CHLORIDE SCH MG: 25 TABLET ORAL at 12:10

## 2018-01-18 RX ADMIN — SODIUM CHLORIDE PRN UNIT: 9 INJECTION, SOLUTION INTRAVENOUS at 12:06

## 2018-01-18 RX ADMIN — Medication SCH EACH: at 23:53

## 2018-01-18 RX ADMIN — Medication SCH EACH: at 17:41

## 2018-01-18 RX ADMIN — CARBIDOPA AND LEVODOPA SCH TAB: 25; 250 TABLET ORAL at 21:02

## 2018-01-18 RX ADMIN — SODIUM CHLORIDE PRN UNIT: 9 INJECTION, SOLUTION INTRAVENOUS at 05:57

## 2018-01-18 RX ADMIN — IPRATROPIUM BROMIDE SCH MG: 0.5 SOLUTION RESPIRATORY (INHALATION) at 14:59

## 2018-01-18 RX ADMIN — ALBUTEROL SULFATE SCH MG: 2.5 SOLUTION RESPIRATORY (INHALATION) at 07:47

## 2018-01-18 RX ADMIN — SODIUM CHLORIDE SCH MLS/HR: 9 INJECTION, SOLUTION INTRAVENOUS at 14:14

## 2018-01-18 RX ADMIN — PANTOPRAZOLE SODIUM SCH MG: 40 GRANULE, DELAYED RELEASE ORAL at 05:52

## 2018-01-18 RX ADMIN — ALBUTEROL SULFATE SCH MG: 2.5 SOLUTION RESPIRATORY (INHALATION) at 19:04

## 2018-01-18 RX ADMIN — ENALAPRIL MALEATE SCH MG: 5 TABLET ORAL at 17:42

## 2018-01-18 RX ADMIN — IPRATROPIUM BROMIDE SCH MG: 0.5 SOLUTION RESPIRATORY (INHALATION) at 19:04

## 2018-01-18 RX ADMIN — Medication SCH EACH: at 05:56

## 2018-01-18 RX ADMIN — CARBIDOPA AND LEVODOPA SCH TAB.SA: 25; 100 TABLET, EXTENDED RELEASE ORAL at 21:02

## 2018-01-18 RX ADMIN — FLUCONAZOLE SCH MG: 100 TABLET ORAL at 08:14

## 2018-01-18 RX ADMIN — Medication SCH EACH: at 08:10

## 2018-01-18 RX ADMIN — CARBIDOPA AND LEVODOPA SCH TAB: 25; 250 TABLET ORAL at 17:41

## 2018-01-18 RX ADMIN — Medication SCH EACH: at 12:03

## 2018-01-18 RX ADMIN — BETHANECHOL CHLORIDE SCH MG: 25 TABLET ORAL at 17:41

## 2018-01-18 RX ADMIN — IPRATROPIUM BROMIDE SCH MG: 0.5 SOLUTION RESPIRATORY (INHALATION) at 10:55

## 2018-01-18 RX ADMIN — DEXTROSE PRN MLS/HR: 50 INJECTION, SOLUTION INTRAVENOUS at 17:35

## 2018-01-18 RX ADMIN — SODIUM CHLORIDE PRN UNIT: 9 INJECTION, SOLUTION INTRAVENOUS at 23:56

## 2018-01-18 RX ADMIN — Medication SCH EACH: at 21:02

## 2018-01-18 RX ADMIN — DEXTROSE PRN MLS/HR: 50 INJECTION, SOLUTION INTRAVENOUS at 06:18

## 2018-01-18 RX ADMIN — SODIUM CHLORIDE PRN UNIT: 9 INJECTION, SOLUTION INTRAVENOUS at 18:06

## 2018-01-18 RX ADMIN — BETHANECHOL CHLORIDE SCH MG: 25 TABLET ORAL at 08:11

## 2018-01-18 RX ADMIN — ENALAPRIL MALEATE SCH MG: 5 TABLET ORAL at 05:52

## 2018-01-18 RX ADMIN — ACETAMINOPHEN PRN MG: 325 TABLET ORAL at 08:10

## 2018-01-19 VITALS — SYSTOLIC BLOOD PRESSURE: 114 MMHG | DIASTOLIC BLOOD PRESSURE: 52 MMHG

## 2018-01-19 VITALS — SYSTOLIC BLOOD PRESSURE: 119 MMHG | DIASTOLIC BLOOD PRESSURE: 56 MMHG

## 2018-01-19 VITALS — SYSTOLIC BLOOD PRESSURE: 122 MMHG | DIASTOLIC BLOOD PRESSURE: 70 MMHG

## 2018-01-19 VITALS — DIASTOLIC BLOOD PRESSURE: 48 MMHG | SYSTOLIC BLOOD PRESSURE: 116 MMHG

## 2018-01-19 VITALS — DIASTOLIC BLOOD PRESSURE: 56 MMHG | SYSTOLIC BLOOD PRESSURE: 110 MMHG

## 2018-01-19 VITALS — SYSTOLIC BLOOD PRESSURE: 131 MMHG | DIASTOLIC BLOOD PRESSURE: 71 MMHG

## 2018-01-19 VITALS — SYSTOLIC BLOOD PRESSURE: 101 MMHG | DIASTOLIC BLOOD PRESSURE: 50 MMHG

## 2018-01-19 LAB
BASOPHILS # BLD AUTO: 0 K/UL (ref 0–8)
BASOPHILS NFR BLD AUTO: 0.4 % (ref 0–2)
BUN SERPL-MCNC: 27 MG/DL (ref 7–18)
CHLORIDE SERPL-SCNC: 108 MMOL/L (ref 98–107)
CO2 SERPL-SCNC: 32 MMOL/L (ref 21–32)
CREAT SERPL-MCNC: 1.1 MG/DL (ref 0.6–1.3)
EOSINOPHIL # BLD AUTO: 0.1 K/UL (ref 0–0.7)
EOSINOPHIL NFR BLD AUTO: 1.9 % (ref 0–7)
GLUCOSE SERPL-MCNC: 233 MG/DL (ref 74–106)
HCT VFR BLD AUTO: 23.2 % (ref 36.7–47.1)
HGB BLD-MCNC: 7.6 G/DL (ref 12.5–16.3)
LYMPHOCYTES # BLD AUTO: 0.7 K/UL (ref 20–40)
LYMPHOCYTES NFR BLD AUTO: 11.5 % (ref 20.5–51.5)
MAGNESIUM SERPL-MCNC: 2.2 MG/DL (ref 1.8–2.4)
MCH RBC QN AUTO: 29.7 UUG (ref 23.8–33.4)
MCHC RBC AUTO-ENTMCNC: 33 G/DL (ref 32.5–36.3)
MCV RBC AUTO: 90 FL (ref 73–96.2)
MONOCYTES # BLD AUTO: 0.4 K/UL (ref 2–10)
MONOCYTES NFR BLD AUTO: 6 % (ref 0–11)
NEUTROPHILS # BLD AUTO: 5.2 K/UL (ref 1.8–8.9)
NEUTROPHILS NFR BLD AUTO: 80.2 % (ref 38.5–71.5)
PHOSPHATE SERPL-MCNC: 2.7 MG/DL (ref 2.5–4.9)
PLATELET # BLD AUTO: 201 K/UL (ref 152–348)
POTASSIUM SERPL-SCNC: 3.3 MMOL/L (ref 3.5–5.1)
RBC # BLD AUTO: 2.57 MIL/UL (ref 4.06–5.63)
VANCOMYCIN SERPL-MCNC: 12.5 UG/ML (ref 18–26)
WBC # BLD AUTO: 6.5 K/UL (ref 3.6–10.2)

## 2018-01-19 PROCEDURE — 30233N1 TRANSFUSION OF NONAUTOLOGOUS RED BLOOD CELLS INTO PERIPHERAL VEIN, PERCUTANEOUS APPROACH: ICD-10-PCS | Performed by: INTERNAL MEDICINE

## 2018-01-19 RX ADMIN — ALBUTEROL SULFATE PRN MG: 2.5 SOLUTION RESPIRATORY (INHALATION) at 03:32

## 2018-01-19 RX ADMIN — SODIUM CHLORIDE SCH MLS/HR: 9 INJECTION, SOLUTION INTRAVENOUS at 03:42

## 2018-01-19 RX ADMIN — PANTOPRAZOLE SODIUM SCH MG: 40 GRANULE, DELAYED RELEASE ORAL at 05:17

## 2018-01-19 RX ADMIN — CARBIDOPA AND LEVODOPA SCH TAB: 25; 250 TABLET ORAL at 16:57

## 2018-01-19 RX ADMIN — ALBUTEROL SULFATE SCH MG: 2.5 SOLUTION RESPIRATORY (INHALATION) at 19:44

## 2018-01-19 RX ADMIN — CARBIDOPA AND LEVODOPA SCH TAB: 25; 250 TABLET ORAL at 21:03

## 2018-01-19 RX ADMIN — CARBIDOPA AND LEVODOPA SCH TAB: 25; 250 TABLET ORAL at 12:21

## 2018-01-19 RX ADMIN — FLUCONAZOLE SCH MG: 100 TABLET ORAL at 09:02

## 2018-01-19 RX ADMIN — IPRATROPIUM BROMIDE SCH MG: 0.5 SOLUTION RESPIRATORY (INHALATION) at 07:03

## 2018-01-19 RX ADMIN — BETHANECHOL CHLORIDE SCH MG: 25 TABLET ORAL at 16:55

## 2018-01-19 RX ADMIN — Medication SCH MG: at 21:55

## 2018-01-19 RX ADMIN — SODIUM CHLORIDE PRN UNIT: 9 INJECTION, SOLUTION INTRAVENOUS at 12:25

## 2018-01-19 RX ADMIN — DEXTROSE SCH MLS/HR: 50 INJECTION, SOLUTION INTRAVENOUS at 12:20

## 2018-01-19 RX ADMIN — CARBIDOPA AND LEVODOPA SCH TAB.SA: 25; 100 TABLET, EXTENDED RELEASE ORAL at 21:03

## 2018-01-19 RX ADMIN — LINAGLIPTIN SCH MG: 5 TABLET, FILM COATED ORAL at 09:03

## 2018-01-19 RX ADMIN — IPRATROPIUM BROMIDE SCH MG: 0.5 SOLUTION RESPIRATORY (INHALATION) at 10:32

## 2018-01-19 RX ADMIN — IPRATROPIUM BROMIDE SCH MG: 0.5 SOLUTION RESPIRATORY (INHALATION) at 14:38

## 2018-01-19 RX ADMIN — Medication SCH EACH: at 16:57

## 2018-01-19 RX ADMIN — MORPHINE SULFATE PRN MG: 4 INJECTION, SOLUTION INTRAMUSCULAR; INTRAVENOUS at 21:04

## 2018-01-19 RX ADMIN — SODIUM CHLORIDE PRN UNIT: 9 INJECTION, SOLUTION INTRAVENOUS at 05:15

## 2018-01-19 RX ADMIN — BETHANECHOL CHLORIDE SCH MG: 25 TABLET ORAL at 09:03

## 2018-01-19 RX ADMIN — ALBUTEROL SULFATE SCH MG: 2.5 SOLUTION RESPIRATORY (INHALATION) at 07:03

## 2018-01-19 RX ADMIN — IPRATROPIUM BROMIDE SCH MG: 0.5 SOLUTION RESPIRATORY (INHALATION) at 19:44

## 2018-01-19 RX ADMIN — Medication SCH EACH: at 05:12

## 2018-01-19 RX ADMIN — SODIUM CHLORIDE SCH MLS/HR: 9 INJECTION, SOLUTION INTRAVENOUS at 15:25

## 2018-01-19 RX ADMIN — CARBIDOPA AND LEVODOPA SCH TAB: 25; 250 TABLET ORAL at 09:02

## 2018-01-19 RX ADMIN — ALBUTEROL SULFATE SCH MG: 2.5 SOLUTION RESPIRATORY (INHALATION) at 14:38

## 2018-01-19 RX ADMIN — SODIUM CHLORIDE PRN UNIT: 9 INJECTION, SOLUTION INTRAVENOUS at 17:00

## 2018-01-19 RX ADMIN — ENALAPRIL MALEATE SCH MG: 5 TABLET ORAL at 05:18

## 2018-01-19 RX ADMIN — Medication SCH EACH: at 09:02

## 2018-01-19 RX ADMIN — Medication SCH EACH: at 21:03

## 2018-01-19 RX ADMIN — ALBUTEROL SULFATE SCH MG: 2.5 SOLUTION RESPIRATORY (INHALATION) at 10:32

## 2018-01-19 RX ADMIN — BETHANECHOL CHLORIDE SCH MG: 25 TABLET ORAL at 12:21

## 2018-01-19 RX ADMIN — Medication SCH EA: at 21:55

## 2018-01-19 RX ADMIN — Medication SCH EACH: at 12:22

## 2018-01-19 RX ADMIN — IPRATROPIUM BROMIDE PRN MG: 0.5 SOLUTION RESPIRATORY (INHALATION) at 03:32

## 2018-01-19 RX ADMIN — Medication SCH MG: at 09:02

## 2018-01-19 RX ADMIN — DEXTROSE PRN MLS/HR: 50 INJECTION, SOLUTION INTRAVENOUS at 03:43

## 2018-01-19 RX ADMIN — INSULIN DETEMIR SCH UNIT: 100 INJECTION, SOLUTION SUBCUTANEOUS at 21:08

## 2018-01-19 RX ADMIN — ASPIRIN SCH MG: 81 TABLET, CHEWABLE ORAL at 09:03

## 2018-01-19 RX ADMIN — ENALAPRIL MALEATE SCH MG: 5 TABLET ORAL at 16:56

## 2018-01-20 VITALS — DIASTOLIC BLOOD PRESSURE: 75 MMHG | SYSTOLIC BLOOD PRESSURE: 146 MMHG

## 2018-01-20 VITALS — DIASTOLIC BLOOD PRESSURE: 69 MMHG | SYSTOLIC BLOOD PRESSURE: 139 MMHG

## 2018-01-20 VITALS — DIASTOLIC BLOOD PRESSURE: 67 MMHG | SYSTOLIC BLOOD PRESSURE: 125 MMHG

## 2018-01-20 VITALS — DIASTOLIC BLOOD PRESSURE: 69 MMHG | SYSTOLIC BLOOD PRESSURE: 124 MMHG

## 2018-01-20 VITALS — SYSTOLIC BLOOD PRESSURE: 98 MMHG | DIASTOLIC BLOOD PRESSURE: 58 MMHG

## 2018-01-20 VITALS — DIASTOLIC BLOOD PRESSURE: 72 MMHG | SYSTOLIC BLOOD PRESSURE: 141 MMHG

## 2018-01-20 LAB
ALP SERPL-CCNC: 50 U/L (ref 50–136)
ALT SERPL W/O P-5'-P-CCNC: 21 U/L (ref 16–63)
AST SERPL-CCNC: 29 U/L (ref 15–37)
BASOPHILS # BLD AUTO: 0 K/UL (ref 0–8)
BASOPHILS NFR BLD AUTO: 0.7 % (ref 0–2)
BILIRUB SERPL-MCNC: 0.5 MG/DL (ref 0.2–1)
BUN SERPL-MCNC: 22 MG/DL (ref 7–18)
CHLORIDE SERPL-SCNC: 111 MMOL/L (ref 98–107)
CO2 SERPL-SCNC: 28 MMOL/L (ref 21–32)
CREAT SERPL-MCNC: 1 MG/DL (ref 0.6–1.3)
EOSINOPHIL # BLD AUTO: 0.1 K/UL (ref 0–0.7)
EOSINOPHIL NFR BLD AUTO: 1.4 % (ref 0–7)
GLUCOSE SERPL-MCNC: 199 MG/DL (ref 74–106)
HCT VFR BLD AUTO: 26.7 % (ref 36.7–47.1)
HGB BLD-MCNC: 9.1 G/DL (ref 12.5–16.3)
LYMPHOCYTES # BLD AUTO: 0.7 K/UL (ref 20–40)
LYMPHOCYTES NFR BLD AUTO: 11.4 % (ref 20.5–51.5)
MAGNESIUM SERPL-MCNC: 2.2 MG/DL (ref 1.8–2.4)
MCH RBC QN AUTO: 30.1 UUG (ref 23.8–33.4)
MCHC RBC AUTO-ENTMCNC: 34 G/DL (ref 32.5–36.3)
MCV RBC AUTO: 88.6 FL (ref 73–96.2)
MONOCYTES # BLD AUTO: 0.4 K/UL (ref 2–10)
MONOCYTES NFR BLD AUTO: 7 % (ref 0–11)
NEUTROPHILS # BLD AUTO: 4.7 K/UL (ref 1.8–8.9)
NEUTROPHILS NFR BLD AUTO: 79.5 % (ref 38.5–71.5)
PHOSPHATE SERPL-MCNC: 3 MG/DL (ref 2.5–4.9)
PLATELET # BLD AUTO: 228 K/UL (ref 152–348)
POTASSIUM SERPL-SCNC: 3.9 MMOL/L (ref 3.5–5.1)
RBC # BLD AUTO: 3.01 MIL/UL (ref 4.06–5.63)
WBC # BLD AUTO: 5.9 K/UL (ref 3.6–10.2)
WS STN SPEC: 6 G/DL (ref 6.4–8.2)

## 2018-01-20 RX ADMIN — Medication SCH EA: at 21:06

## 2018-01-20 RX ADMIN — Medication SCH EACH: at 08:10

## 2018-01-20 RX ADMIN — Medication SCH EACH: at 00:13

## 2018-01-20 RX ADMIN — DEXTROSE PRN MLS/HR: 50 INJECTION, SOLUTION INTRAVENOUS at 18:48

## 2018-01-20 RX ADMIN — CARBIDOPA AND LEVODOPA SCH TAB: 25; 250 TABLET ORAL at 08:09

## 2018-01-20 RX ADMIN — IPRATROPIUM BROMIDE SCH MG: 0.5 SOLUTION RESPIRATORY (INHALATION) at 10:55

## 2018-01-20 RX ADMIN — DEXTROSE SCH MLS/HR: 50 INJECTION, SOLUTION INTRAVENOUS at 05:37

## 2018-01-20 RX ADMIN — IPRATROPIUM BROMIDE SCH MG: 0.5 SOLUTION RESPIRATORY (INHALATION) at 14:57

## 2018-01-20 RX ADMIN — ALBUTEROL SULFATE SCH MG: 2.5 SOLUTION RESPIRATORY (INHALATION) at 07:28

## 2018-01-20 RX ADMIN — ALBUTEROL SULFATE SCH MG: 2.5 SOLUTION RESPIRATORY (INHALATION) at 14:57

## 2018-01-20 RX ADMIN — IPRATROPIUM BROMIDE SCH MG: 0.5 SOLUTION RESPIRATORY (INHALATION) at 07:28

## 2018-01-20 RX ADMIN — BETHANECHOL CHLORIDE SCH MG: 25 TABLET ORAL at 12:46

## 2018-01-20 RX ADMIN — Medication PRN ML: at 18:54

## 2018-01-20 RX ADMIN — SODIUM CHLORIDE PRN UNIT: 9 INJECTION, SOLUTION INTRAVENOUS at 11:04

## 2018-01-20 RX ADMIN — PANTOPRAZOLE SODIUM SCH MG: 40 GRANULE, DELAYED RELEASE ORAL at 05:37

## 2018-01-20 RX ADMIN — CARBIDOPA AND LEVODOPA SCH TAB: 25; 250 TABLET ORAL at 21:05

## 2018-01-20 RX ADMIN — IPRATROPIUM BROMIDE SCH MG: 0.5 SOLUTION RESPIRATORY (INHALATION) at 19:59

## 2018-01-20 RX ADMIN — ENALAPRIL MALEATE SCH MG: 5 TABLET ORAL at 05:40

## 2018-01-20 RX ADMIN — ALBUTEROL SULFATE SCH MG: 2.5 SOLUTION RESPIRATORY (INHALATION) at 10:55

## 2018-01-20 RX ADMIN — SODIUM CHLORIDE SCH MLS/HR: 9 INJECTION, SOLUTION INTRAVENOUS at 01:37

## 2018-01-20 RX ADMIN — BETHANECHOL CHLORIDE SCH MG: 25 TABLET ORAL at 18:47

## 2018-01-20 RX ADMIN — CARBIDOPA AND LEVODOPA SCH TAB: 25; 250 TABLET ORAL at 18:48

## 2018-01-20 RX ADMIN — Medication SCH EACH: at 21:04

## 2018-01-20 RX ADMIN — Medication SCH EACH: at 11:00

## 2018-01-20 RX ADMIN — FLUCONAZOLE SCH MG: 100 TABLET ORAL at 08:08

## 2018-01-20 RX ADMIN — LINAGLIPTIN SCH MG: 5 TABLET, FILM COATED ORAL at 08:10

## 2018-01-20 RX ADMIN — BETHANECHOL CHLORIDE SCH MG: 25 TABLET ORAL at 08:08

## 2018-01-20 RX ADMIN — Medication SCH EACH: at 06:36

## 2018-01-20 RX ADMIN — SODIUM CHLORIDE PRN UNIT: 9 INJECTION, SOLUTION INTRAVENOUS at 19:04

## 2018-01-20 RX ADMIN — ALBUTEROL SULFATE SCH MG: 2.5 SOLUTION RESPIRATORY (INHALATION) at 19:59

## 2018-01-20 RX ADMIN — ENALAPRIL MALEATE SCH MG: 5 TABLET ORAL at 18:47

## 2018-01-20 RX ADMIN — Medication SCH EACH: at 19:03

## 2018-01-20 RX ADMIN — CARBIDOPA AND LEVODOPA SCH TAB.SA: 25; 100 TABLET, EXTENDED RELEASE ORAL at 21:04

## 2018-01-20 RX ADMIN — CARBIDOPA AND LEVODOPA SCH TAB: 25; 250 TABLET ORAL at 12:47

## 2018-01-20 RX ADMIN — SODIUM CHLORIDE PRN UNIT: 9 INJECTION, SOLUTION INTRAVENOUS at 06:10

## 2018-01-20 RX ADMIN — Medication SCH MG: at 08:15

## 2018-01-20 RX ADMIN — Medication SCH MG: at 21:04

## 2018-01-20 RX ADMIN — ASPIRIN SCH MG: 81 TABLET, CHEWABLE ORAL at 08:08

## 2018-01-21 VITALS — SYSTOLIC BLOOD PRESSURE: 105 MMHG | DIASTOLIC BLOOD PRESSURE: 62 MMHG

## 2018-01-21 VITALS — SYSTOLIC BLOOD PRESSURE: 137 MMHG | DIASTOLIC BLOOD PRESSURE: 57 MMHG

## 2018-01-21 VITALS — DIASTOLIC BLOOD PRESSURE: 59 MMHG | SYSTOLIC BLOOD PRESSURE: 95 MMHG

## 2018-01-21 LAB
ALP SERPL-CCNC: 48 U/L (ref 50–136)
ALT SERPL W/O P-5'-P-CCNC: 17 U/L (ref 16–63)
AST SERPL-CCNC: 24 U/L (ref 15–37)
BASOPHILS # BLD AUTO: 0 K/UL (ref 0–8)
BASOPHILS NFR BLD AUTO: 0.4 % (ref 0–2)
BILIRUB SERPL-MCNC: 0.3 MG/DL (ref 0.2–1)
BUN SERPL-MCNC: 20 MG/DL (ref 7–18)
CHLORIDE SERPL-SCNC: 109 MMOL/L (ref 98–107)
CO2 SERPL-SCNC: 30 MMOL/L (ref 21–32)
CREAT SERPL-MCNC: 0.9 MG/DL (ref 0.6–1.3)
EOSINOPHIL # BLD AUTO: 0.1 K/UL (ref 0–0.7)
EOSINOPHIL NFR BLD AUTO: 1.7 % (ref 0–7)
GLUCOSE SERPL-MCNC: 109 MG/DL (ref 74–106)
HCT VFR BLD AUTO: 26.7 % (ref 36.7–47.1)
HGB BLD-MCNC: 9 G/DL (ref 12.5–16.3)
LYMPHOCYTES # BLD AUTO: 0.8 K/UL (ref 20–40)
LYMPHOCYTES NFR BLD AUTO: 14.2 % (ref 20.5–51.5)
MAGNESIUM SERPL-MCNC: 2.1 MG/DL (ref 1.8–2.4)
MCH RBC QN AUTO: 29.9 UUG (ref 23.8–33.4)
MCHC RBC AUTO-ENTMCNC: 34 G/DL (ref 32.5–36.3)
MCV RBC AUTO: 88.7 FL (ref 73–96.2)
MONOCYTES # BLD AUTO: 0.5 K/UL (ref 2–10)
MONOCYTES NFR BLD AUTO: 8.6 % (ref 0–11)
NEUTROPHILS # BLD AUTO: 4 K/UL (ref 1.8–8.9)
NEUTROPHILS NFR BLD AUTO: 75.1 % (ref 38.5–71.5)
PHOSPHATE SERPL-MCNC: 3.6 MG/DL (ref 2.5–4.9)
PLATELET # BLD AUTO: 247 K/UL (ref 152–348)
POTASSIUM SERPL-SCNC: 3.5 MMOL/L (ref 3.5–5.1)
RBC # BLD AUTO: 3.01 MIL/UL (ref 4.06–5.63)
WBC # BLD AUTO: 5.4 K/UL (ref 3.6–10.2)
WS STN SPEC: 6.2 G/DL (ref 6.4–8.2)

## 2018-01-21 RX ADMIN — Medication SCH EACH: at 06:34

## 2018-01-21 RX ADMIN — Medication SCH EACH: at 00:15

## 2018-01-21 RX ADMIN — ALBUTEROL SULFATE SCH MG: 2.5 SOLUTION RESPIRATORY (INHALATION) at 07:11

## 2018-01-21 RX ADMIN — IPRATROPIUM BROMIDE SCH MG: 0.5 SOLUTION RESPIRATORY (INHALATION) at 11:06

## 2018-01-21 RX ADMIN — SODIUM CHLORIDE PRN UNIT: 9 INJECTION, SOLUTION INTRAVENOUS at 06:40

## 2018-01-21 RX ADMIN — BETHANECHOL CHLORIDE SCH MG: 25 TABLET ORAL at 09:27

## 2018-01-21 RX ADMIN — Medication SCH EACH: at 09:35

## 2018-01-21 RX ADMIN — Medication SCH MG: at 09:28

## 2018-01-21 RX ADMIN — FLUCONAZOLE SCH MG: 100 TABLET ORAL at 09:27

## 2018-01-21 RX ADMIN — ASPIRIN SCH MG: 81 TABLET, CHEWABLE ORAL at 09:27

## 2018-01-21 RX ADMIN — ALBUTEROL SULFATE SCH MG: 2.5 SOLUTION RESPIRATORY (INHALATION) at 11:06

## 2018-01-21 RX ADMIN — ENALAPRIL MALEATE SCH MG: 5 TABLET ORAL at 06:00

## 2018-01-21 RX ADMIN — LINAGLIPTIN SCH MG: 5 TABLET, FILM COATED ORAL at 09:27

## 2018-01-21 RX ADMIN — SODIUM CHLORIDE PRN UNIT: 9 INJECTION, SOLUTION INTRAVENOUS at 00:18

## 2018-01-21 RX ADMIN — CARBIDOPA AND LEVODOPA SCH TAB: 25; 250 TABLET ORAL at 09:27

## 2018-01-21 RX ADMIN — IPRATROPIUM BROMIDE SCH MG: 0.5 SOLUTION RESPIRATORY (INHALATION) at 07:11

## 2018-01-21 RX ADMIN — PANTOPRAZOLE SODIUM SCH MG: 40 GRANULE, DELAYED RELEASE ORAL at 06:33
